# Patient Record
Sex: FEMALE | Race: WHITE | NOT HISPANIC OR LATINO | Employment: FULL TIME | URBAN - METROPOLITAN AREA
[De-identification: names, ages, dates, MRNs, and addresses within clinical notes are randomized per-mention and may not be internally consistent; named-entity substitution may affect disease eponyms.]

---

## 2020-09-17 ENCOUNTER — TELEPHONE (OUTPATIENT)
Dept: ENDOSCOPY | Facility: HOSPITAL | Age: 55
End: 2020-09-17

## 2020-09-17 DIAGNOSIS — K31.9 GASTRIC LESION: Primary | ICD-10-CM

## 2020-09-17 NOTE — TELEPHONE ENCOUNTER
----- Message from Ela Osman MD sent at 9/17/2020  1:12 PM CDT -----  Ok. Eus anywhere  ----- Message -----  From: Nela Polanco MA  Sent: 9/17/2020  11:29 AM CDT  To: Ela Osman MD      ----- Message -----  From: Lindsay Zavala  Sent: 9/17/2020  11:17 AM CDT  To: Richar Guerin    Good morning,    The pt listed above is being referred from Dr. Rafael Eduardo for EUS of submucosal lesion of the esophagus . I have scanned the referral and records into . Please advise or contact pt to schedule appt at your earliest convenience.    Thank You,  Lindsay Foley

## 2020-09-22 ENCOUNTER — TELEPHONE (OUTPATIENT)
Dept: ENDOSCOPY | Facility: HOSPITAL | Age: 55
End: 2020-09-22

## 2020-09-22 NOTE — TELEPHONE ENCOUNTER
Spoke with patient. EUS scheduled for 10/12 at 9a. Reviewed prep instructions. Ms Britt verbalized understanding.

## 2020-10-02 ENCOUNTER — TELEPHONE (OUTPATIENT)
Dept: ENDOSCOPY | Facility: HOSPITAL | Age: 55
End: 2020-10-02

## 2020-10-02 RX ORDER — LISINOPRIL 20 MG/1
20 TABLET ORAL DAILY
COMMUNITY

## 2020-10-02 RX ORDER — ONDANSETRON 4 MG/1
8 TABLET, FILM COATED ORAL EVERY 8 HOURS PRN
COMMUNITY

## 2020-10-02 RX ORDER — INSULIN LISPRO 100 [IU]/ML
10 INJECTION, SOLUTION INTRAVENOUS; SUBCUTANEOUS
COMMUNITY

## 2020-10-02 RX ORDER — PANTOPRAZOLE SODIUM 20 MG/1
40 TABLET, DELAYED RELEASE ORAL
COMMUNITY

## 2020-10-02 RX ORDER — FENOFIBRATE 40 MG/1
TABLET ORAL
COMMUNITY

## 2020-10-02 RX ORDER — TRAMADOL HYDROCHLORIDE 50 MG/1
50 TABLET ORAL EVERY 12 HOURS PRN
COMMUNITY

## 2020-10-02 RX ORDER — PROMETHAZINE HYDROCHLORIDE 12.5 MG/1
12.5 TABLET ORAL EVERY 8 HOURS PRN
COMMUNITY

## 2020-10-02 RX ORDER — FUROSEMIDE 20 MG/1
20 TABLET ORAL DAILY PRN
COMMUNITY

## 2020-10-02 RX ORDER — SUCRALFATE 1 G/1
1 TABLET ORAL 4 TIMES DAILY
COMMUNITY

## 2020-10-02 RX ORDER — METFORMIN HYDROCHLORIDE EXTENDED-RELEASE TABLETS 500 MG/1
500 TABLET, FILM COATED, EXTENDED RELEASE ORAL
COMMUNITY
End: 2021-09-28

## 2020-10-02 NOTE — TELEPHONE ENCOUNTER
Received request to schedule patient for EUS on 10/12/20 at 9:00am. Spoke with Patient. Reviewed medical history and medications. Instructed on procedure and prep. Patient verbalized understanding of instructions. E-mailed copy of instructions to address in chart.

## 2020-10-12 ENCOUNTER — ANESTHESIA EVENT (OUTPATIENT)
Dept: ENDOSCOPY | Facility: HOSPITAL | Age: 55
End: 2020-10-12
Payer: COMMERCIAL

## 2020-10-12 ENCOUNTER — ANESTHESIA (OUTPATIENT)
Dept: ENDOSCOPY | Facility: HOSPITAL | Age: 55
End: 2020-10-12
Payer: COMMERCIAL

## 2020-10-12 ENCOUNTER — HOSPITAL ENCOUNTER (OUTPATIENT)
Facility: HOSPITAL | Age: 55
Discharge: HOME | End: 2020-10-12
Attending: INTERNAL MEDICINE | Admitting: INTERNAL MEDICINE
Payer: COMMERCIAL

## 2020-10-12 VITALS
TEMPERATURE: 98 F | HEART RATE: 73 BPM | RESPIRATION RATE: 20 BRPM | WEIGHT: 206 LBS | SYSTOLIC BLOOD PRESSURE: 169 MMHG | DIASTOLIC BLOOD PRESSURE: 85 MMHG | OXYGEN SATURATION: 98 % | HEIGHT: 64 IN | BODY MASS INDEX: 35.17 KG/M2

## 2020-10-12 DIAGNOSIS — K22.9 SUBEPITHELIAL ESOPHAGEAL LESION: ICD-10-CM

## 2020-10-12 LAB
POCT GLUCOSE: 230 MG/DL (ref 70–110)
POCT GLUCOSE: 239 MG/DL (ref 70–110)
SARS-COV-2 RDRP RESP QL NAA+PROBE: NEGATIVE

## 2020-10-12 PROCEDURE — D9220A PRA ANESTHESIA: ICD-10-PCS | Mod: ANES,,, | Performed by: ANESTHESIOLOGY

## 2020-10-12 PROCEDURE — U0002 COVID-19 LAB TEST NON-CDC: HCPCS

## 2020-10-12 PROCEDURE — D9220A PRA ANESTHESIA: Mod: CRNA,,, | Performed by: NURSE ANESTHETIST, CERTIFIED REGISTERED

## 2020-10-12 PROCEDURE — 43259 EGD US EXAM DUODENUM/JEJUNUM: CPT | Performed by: INTERNAL MEDICINE

## 2020-10-12 PROCEDURE — 43259 EGD US EXAM DUODENUM/JEJUNUM: CPT | Mod: ,,, | Performed by: INTERNAL MEDICINE

## 2020-10-12 PROCEDURE — 43259 PR ENDOSCOPIC ULTRASOUND EXAM: ICD-10-PCS | Mod: ,,, | Performed by: INTERNAL MEDICINE

## 2020-10-12 PROCEDURE — 63600175 PHARM REV CODE 636 W HCPCS: Performed by: NURSE ANESTHETIST, CERTIFIED REGISTERED

## 2020-10-12 PROCEDURE — 25000003 PHARM REV CODE 250: Performed by: NURSE ANESTHETIST, CERTIFIED REGISTERED

## 2020-10-12 PROCEDURE — 82962 GLUCOSE BLOOD TEST: CPT | Performed by: INTERNAL MEDICINE

## 2020-10-12 PROCEDURE — 37000009 HC ANESTHESIA EA ADD 15 MINS: Performed by: INTERNAL MEDICINE

## 2020-10-12 PROCEDURE — 37000008 HC ANESTHESIA 1ST 15 MINUTES: Performed by: INTERNAL MEDICINE

## 2020-10-12 PROCEDURE — D9220A PRA ANESTHESIA: Mod: ANES,,, | Performed by: ANESTHESIOLOGY

## 2020-10-12 PROCEDURE — D9220A PRA ANESTHESIA: ICD-10-PCS | Mod: CRNA,,, | Performed by: NURSE ANESTHETIST, CERTIFIED REGISTERED

## 2020-10-12 RX ORDER — LIDOCAINE HCL/PF 100 MG/5ML
SYRINGE (ML) INTRAVENOUS
Status: DISCONTINUED | OUTPATIENT
Start: 2020-10-12 | End: 2020-10-12

## 2020-10-12 RX ORDER — PROPOFOL 10 MG/ML
VIAL (ML) INTRAVENOUS CONTINUOUS PRN
Status: DISCONTINUED | OUTPATIENT
Start: 2020-10-12 | End: 2020-10-12

## 2020-10-12 RX ORDER — SODIUM CHLORIDE 0.9 % (FLUSH) 0.9 %
10 SYRINGE (ML) INJECTION
Status: DISCONTINUED | OUTPATIENT
Start: 2020-10-12 | End: 2020-10-12 | Stop reason: HOSPADM

## 2020-10-12 RX ORDER — SODIUM CHLORIDE 9 MG/ML
INJECTION, SOLUTION INTRAVENOUS CONTINUOUS PRN
Status: DISCONTINUED | OUTPATIENT
Start: 2020-10-12 | End: 2020-10-12

## 2020-10-12 RX ORDER — ONDANSETRON 2 MG/ML
4 INJECTION INTRAMUSCULAR; INTRAVENOUS ONCE AS NEEDED
Status: DISCONTINUED | OUTPATIENT
Start: 2020-10-12 | End: 2020-10-12 | Stop reason: HOSPADM

## 2020-10-12 RX ORDER — PROPOFOL 10 MG/ML
VIAL (ML) INTRAVENOUS
Status: DISCONTINUED | OUTPATIENT
Start: 2020-10-12 | End: 2020-10-12

## 2020-10-12 RX ADMIN — Medication 100 MG: at 09:10

## 2020-10-12 RX ADMIN — PROPOFOL 80 MG: 10 INJECTION, EMULSION INTRAVENOUS at 09:10

## 2020-10-12 RX ADMIN — SODIUM CHLORIDE: 0.9 INJECTION, SOLUTION INTRAVENOUS at 08:10

## 2020-10-12 RX ADMIN — PROPOFOL 200 MCG/KG/MIN: 10 INJECTION, EMULSION INTRAVENOUS at 09:10

## 2020-10-12 NOTE — ANESTHESIA PREPROCEDURE EVALUATION
10/12/2020  Ritika Britt is a 55 y.o., female   Pre-operative evaluation for Procedure(s) (LRB):  ULTRASOUND, UPPER GI TRACT, ENDOSCOPIC (N/A)    Ritika Britt is a 55 y.o. female   Procedure was attempted at another facility but they were unable to get the biopsy, hx of PONV, but not with treatment. Hx of difficult intubation due to short neck and increased girth, but overcome with video laryngoscopy.     Patient Active Problem List   Diagnosis    Subepithelial esophageal lesion       Review of patient's allergies indicates:  No Known Allergies    No current facility-administered medications on file prior to encounter.      No current outpatient medications on file prior to encounter.       Past Surgical History:   Procedure Laterality Date    COLONOSCOPY      ESOPHAGOGASTRODUODENOSCOPY      HYSTERECTOMY         Anesthesia Evaluation    I have reviewed the Patient Summary Reports.   I have reviewed the NPO Status.   I have reviewed the Medications.     Review of Systems  Anesthesia Hx:  No problems with previous Anesthesia Denies Hx of Anesthetic complications  History of prior surgery of interest to airway management or planning: Denies Family Hx of Anesthesia complications.   Denies Personal Hx of Anesthesia complications.   Social:  No Alcohol Use, Non-Smoker    Hematology/Oncology:  Hematology Normal   Oncology Normal     EENT/Dental:EENT/Dental Normal   Cardiovascular:   Exercise tolerance: good Hypertension    Pulmonary:  Pulmonary Normal    Renal/:  Renal/ Normal     Hepatic/GI:  Hepatic/GI Normal Subepithelial esophageal lesion in need of biopsy   Neurological:  Neurology Normal    Endocrine:  Endocrine Normal    Psych:  Psychiatric Normal           Physical Exam  General:  Obesity, Well nourished    Airway/Jaw/Neck:  Airway Findings: Mouth Opening: Normal Tongue: Normal   General Airway Assessment: Adult, Average, Possible difficult intubation  Mallampati: III  TM Distance: 4 - 6 cm  Jaw/Neck Findings:  Neck ROM: Normal ROM  Neck Findings:  Girth Increased, Short Neck      Dental:  Dental Findings: In tact   Chest/Lungs:  Chest/Lungs Findings: Normal Respiratory Rate, Clear to auscultation     Heart/Vascular:  Heart Findings: Rate: Normal  Rhythm: Regular Rhythm        Mental Status:  Mental Status Findings:  Alert and Oriented, Cooperative         Anesthesia Plan  Type of Anesthesia, risks & benefits discussed:  Anesthesia Type:  general  Patient's Preference:   Intra-op Monitoring Plan: standard ASA monitors  Intra-op Monitoring Plan Comments:   Post Op Pain Control Plan: multimodal analgesia, IV/PO Opioids PRN and per primary service following discharge from PACU  Post Op Pain Control Plan Comments:   Induction:   IV  Beta Blocker:  Patient is not currently on a Beta-Blocker (No further documentation required).       Informed Consent: Patient understands risks and agrees with Anesthesia plan.  Questions answered. Anesthesia consent signed with patient.  ASA Score: 2     Day of Surgery Review of History & Physical:    H&P update referred to the provider.         Ready For Surgery From Anesthesia Perspective.

## 2020-10-12 NOTE — TRANSFER OF CARE
"Anesthesia Transfer of Care Note    Patient: Ritika Britt    Procedure(s) Performed: Procedure(s) (LRB):  ULTRASOUND, UPPER GI TRACT, ENDOSCOPIC (N/A)    Patient location: [facility]    Anesthesia Type: general    Transport from OR: Transported from OR on room air with adequate spontaneous ventilation    Post pain: adequate analgesia    Post assessment: no apparent anesthetic complications and tolerated procedure well    Post vital signs: stable    Level of consciousness: awake, alert and oriented    Nausea/Vomiting: no nausea/vomiting    Complications: none    Transfer of care protocol was followed      Last vitals:   Visit Vitals  BP (!) 151/75 (BP Location: Left arm, Patient Position: Lying)   Pulse 85   Temp 36.5 °C (97.7 °F) (Temporal)   Resp 18   Ht 5' 4" (1.626 m)   Wt 93.4 kg (206 lb)   SpO2 (!) 94%   Breastfeeding No   BMI 35.36 kg/m²     "

## 2020-10-12 NOTE — H&P
Short Stay Endoscopy History and Physical    PCP - Primary Doctor No  Referring Physician - Ela Osman MD  [address] [place] [address]    Procedure - eus  ASA - per anesthesia  Mallampati - per anesthesia  History of Anesthesia problems - no  Family history Anesthesia problems -  no   Plan of anesthesia - General    HPI:  This is a 55 y.o. female here for evaluation of: esophageal lesion    Reflux - no  Dysphagia - no  Abdominal pain - no  Diarrhea - no    ROS:  Constitutional: No fevers, chills, No weight loss  CV: No chest pain  Pulm: No cough, No shortness of breath  Ophtho: No vision changes  GI: see HPI  Derm: No rash    Medical History:  has a past medical history of Dysphagia, Epigastric pain, Esophagitis, Fatty liver, Heartburn, Nausea, and Submucosal lesion of esophagus.    Surgical History:  has a past surgical history that includes Hysterectomy; Esophagogastroduodenoscopy; and Colonoscopy.    Family History: family history includes Cancer in her father and mother; Diabetes in her father and mother; Victoria-Danlos syndrome in her sister; Hypertension in her father and mother..    Social History:  reports that she has never smoked. She has never used smokeless tobacco. She reports that she does not drink alcohol or use drugs.    Review of patient's allergies indicates:  No Known Allergies    Medications:   Medications Prior to Admission   Medication Sig Dispense Refill Last Dose    fenofibrate 40 mg Tab Take by mouth.   10/11/2020 at Unknown time    furosemide (LASIX) 20 MG tablet Take 20 mg by mouth daily as needed.   Past Week at Unknown time    insulin glargine,hum.rec.anlog (LANTUS SUBQ) Inject 70 Units into the skin once daily.   10/11/2020 at Unknown time    insulin lispro 100 unit/mL injection Inject 10 Units into the skin 3 (three) times daily before meals.   10/11/2020 at Unknown time    lisinopriL (PRINIVIL,ZESTRIL) 20 MG tablet Take 20 mg by mouth once daily.    10/12/2020 at Unknown time    metFORMIN (FORTAMET) 500 mg 24hr tablet Take 500 mg by mouth daily with breakfast.   10/11/2020 at Unknown time    ondansetron (ZOFRAN) 4 MG tablet Take 8 mg by mouth every 8 (eight) hours as needed for Nausea.   Past Month at Unknown time    pantoprazole (PROTONIX) 20 MG tablet Take 40 mg by mouth 2 (two) times daily before meals.   10/11/2020 at Unknown time    promethazine (PHENERGAN) 12.5 MG Tab Take 12.5 mg by mouth every 8 (eight) hours as needed.   Past Month at Unknown time    sucralfate (CARAFATE) 1 gram tablet Take 1 g by mouth 4 (four) times daily.   10/11/2020 at Unknown time    traMADoL (ULTRAM) 50 mg tablet Take 50 mg by mouth every 12 (twelve) hours as needed for Pain.   Past Month at Unknown time       Physical Exam:    Vital Signs:   Vitals:    10/12/20 0821   BP: (!) 173/96   Pulse: 87   Resp: 17   Temp: 98.1 °F (36.7 °C)       General Appearance: Well appearing in no acute distress    Labs:  No results found for: WBC, HGB, HCT, PLT, CHOL, TRIG, HDL, LDLDIRECT, ALT, AST, NA, K, CL, CREATININE, BUN, CO2, TSH, PSA, INR, GLUF, HGBA1C, MICROALBUR    I have explained the risks and benefits of this endoscopic procedure to the patient including but not limited to bleeding, inflammation, infection, perforation, and death.      Tree Garsia MD

## 2020-10-12 NOTE — DISCHARGE INSTRUCTIONS
Endoscopic Ultrasound (EUS)    An endoscopic ultrasound (EUS) is a test to look at the inside of your gastrointestinal (GI) tract. It's commonly used to look for cancers or growths in the esophagus, stomach, pancreas, liver, and rectum. It can help to stage cancer (see how advanced a cancer is). EUS may also be used to help diagnose certain diseases or to drain cysts or abscesses.  What is EUS?  EUS shows both ultrasound images and live video of the GI tract. During the test, a flexible tube called an endoscope (scope) is used. At the end of the scope is a tiny video camera and light. The video camera sends live images to a monitor. The scope also contains a very small ultrasound device. This uses sound waves to create images and send them to a monitor.  A needle is passed through the scope. The needle can be used take a small sample of tissue for testing. This is called a biopsy. The needle can be used to take a sample of fluid. This is called fine-needle aspiration (FNA).  Risks and possible complications of EUS  Risks and possible complications include the following:  · Bleeding  · Infection  · A perforation (hole) in the digestive tract   · Risks of sedation or anesthesia   Before the test  Be prepared prior to the test:  · Tell your healthcare provider what medicine you take. This includes vitamins, herbs, and over-the-counter medicine. It also includes any blood thinners, such as warfarin, clopidogrel, ibuprofen, or daily aspirin. Ask your healthcare provider if you need to stop taking some or all of them before the test.  · You may be prescribed antibiotics to take before or after the test. This depends on the area being studied and what is done during the test. These medicines help prevent infection.  · Carefully follow the instructions for preparing for the test to make sure results are accurate. Instructions may include:  ¨ If youre having an EUS of the upper GI tract (esophagus, stomach, duodenum,  pancreas, liver):  § Do not eat or drink for 6 hours before the test.  ¨ If youre having an EUS of the lower GI tract (rectum):  § Before the test, do bowel prep as instructed to clean your rectum of stool. This may involve a clear liquid diet and using a laxative (liquid or pills) the night before the test. Or it may mean doing one or more enemas the morning of the test.  § Do not eat or drink for 6 hours before the test.  · Be sure to arrive on time at the facility. Bring your identification and health insurance card. Leave valuables at home. If you have them, bring X-rays or other test results with you.  Let the healthcare provider know  For your safety, tell the healthcare provider if you:  · Take insulin. Your dose may need to be changed on the day of your test.  · Are allergic to latex.  · Have any other allergies.  · Are taking blood thinners.   During the test  An endoscopic ultrasound usually takes place in a hospital. The procedure itself may take 1 to 2 hours. You will likely go home soon afterward. During the test:  · You lie on your left side on an exam table.  · An intravenous (IV) line will be put into a vein in your arm or hand. This line supplies fluids and medicines. To keep you comfortable during the test, you will be given a sedative medicine. This medicine prevents discomfort and will make you sleepy.  · If you are having an EUS of the upper GI tract, local anesthetic may be sprayed in your throat. This will help you be more comfortable as the healthcare provider inserts the scope. The healthcare provider then gently puts the flexible scope into your mouth or nose and down your throat.  · If youre having an EUS of the lower GI tract, the healthcare provider gently puts the flexible scope into your anus.  · During the test, the scope sends live video and ultrasound images from inside your body to nearby monitors. These are used to examine your GI tract. Specialized procedures, such as drainage,  are done as needed.  · The healthcare provider may discuss the results with you soon after the test. Biopsy results take several  days.  · In most cases, you can go home within a few hours of the test. When you leave the facility, have an adult family member or friend drive you, even if you don't feel that sleepy.  After the test  Here is what to expect after the test:  · You may feel tired from the sedative. This should wear off by the end of the day.  · If you had an upper digestive endoscopy, your throat may feel sore for a day or two. Over-the-counter sore throat lozenges and spray should help.  · You can eat and drink normally as soon as the test is done.  When to call the healthcare provider  Call your healthcare provider if you notice any of the following:  · Fever of 100.4°F (38.0°C) or higher, or as advised by your healthcare provider  · Shortness of breath  · Vomiting blood, blood in stool, or black stools  · Coughing or hoarse voice that wont go away   Date Last Reviewed: 7/1/2016  © 9075-5947 [employer]. [address] [place] [id number]. All rights reserved. This information is not intended as a substitute for professional medical care. Always follow your healthcare professional's instructions.

## 2020-10-12 NOTE — [employer] PATIENT INSTRUCTIONS
Discharge Summary/Instructions after an Endoscopic Procedure  Patient Name: Ritika Roldan  Patient MRN: [id number]  Patient YOB: 1965 Monday, October 12, 2020  Tree Garsia MD  RESTRICTIONS:  During your procedure today, you received medications for sedation.  These   medications may affect your judgment, balance and coordination.  Therefore,   for 24 hours, you have the following restrictions:   - DO NOT drive a car, operate machinery, make legal/financial decisions,   sign important papers or drink alcohol.    ACTIVITY:  Today: no heavy lifting, straining or running due to procedural   sedation/anesthesia.  The following day: return to full activity including work.  DIET:  Eat and drink normally unless instructed otherwise.     TREATMENT FOR COMMON SIDE EFFECTS:  - Mild abdominal pain, nausea, belching, bloating or excessive gas:  rest,   eat lightly and use a heating pad.  - Sore Throat: treat with throat lozenges and/or gargle with warm salt   water.  - Because air was used during the procedure, expelling large amounts of air   from your rectum or belching is normal.  - If a bowel prep was taken, you may not have a bowel movement for 1-3 days.    This is normal.  SYMPTOMS TO WATCH FOR AND REPORT TO YOUR PHYSICIAN:  1. Abdominal pain or bloating, other than gas cramps.  2. Chest pain.  3. Back pain.  4. Signs of infection such as: chills or fever occurring within 24 hours   after the procedure.  5. Rectal bleeding, which would show as bright red, maroon, or black stools.   (A tablespoon of blood from the rectum is not serious, especially if   hemorrhoids are present.)  6. Vomiting.  7. Weakness or dizziness.  GO DIRECTLY TO THE NEAREST EMERGENCY ROOM IF YOU HAVE ANY OF THE FOLLOWING:      Difficulty breathing              Chills and/or fever over 101 F   Persistent vomiting and/or vomiting blood   Severe abdominal pain   Severe chest pain   Black, tarry stools   Bleeding- more than one  tablespoon   Any other symptom or condition that you feel may need urgent attention  Your doctor recommends these additional instructions:  If any biopsies were taken, your doctors clinic will contact you in 1 to 2   weeks with any results.  - Discharge patient to home.   - Resume previous diet.   - Continue present medications.   - Repeat the upper endoscopic ultrasound in 6 months for surveillance. If   it's larger, plan for FNA and surgical referral.  For questions, problems or results please call your physician - Tree Garsia MD at Work:  [phone].  [facility], EMERGENCY ROOM PHONE NUMBER: [phone]  IF A COMPLICATION OR EMERGENCY SITUATION ARISES AND YOU ARE UNABLE TO REACH   YOUR PHYSICIAN - GO DIRECTLY TO THE EMERGENCY ROOM.  Tree Garsia MD  10/12/2020 9:24:47 AM  This report has been verified and signed electronically.  [employer]

## 2020-10-12 NOTE — PLAN OF CARE
Plan of care reviewed with patient/family. Discharge instruction given; verbalizes understanding. Handouts given. Vital signs stable. Denies any pain/SOB/CP. Tolerated procedure/ anesthesia well. Consents in chart.

## 2020-10-12 NOTE — ANESTHESIA POSTPROCEDURE EVALUATION
Anesthesia Post Evaluation    Patient: Ritika Britt    Procedure(s) Performed: Procedure(s) (LRB):  ULTRASOUND, UPPER GI TRACT, ENDOSCOPIC (N/A)    Final Anesthesia Type: general    Patient location during evaluation: [facility]  Patient participation: Yes- Able to Participate  Level of consciousness: awake and alert and oriented  Post-procedure vital signs: reviewed and stable  Pain management: adequate  Airway patency: patent    PONV status at discharge: No PONV  Anesthetic complications: no      Cardiovascular status: blood pressure returned to baseline and hemodynamically stable  Respiratory status: unassisted, spontaneous ventilation and room air  Hydration status: euvolemic  Follow-up not needed.          Vitals Value Taken Time   /85 10/12/20 1002   Temp 36.5 °C (97.7 °F) 10/12/20 0915   Pulse 75 10/12/20 1003   Resp 20 10/12/20 1000   SpO2 97 % 10/12/20 1003   Vitals shown include unvalidated device data.      No case tracking events are documented in the log.      Pain/Yi Score: Yi Score: 10 (10/12/2020 10:06 AM)

## 2021-02-04 ENCOUNTER — TELEPHONE (OUTPATIENT)
Dept: ENDOSCOPY | Facility: HOSPITAL | Age: 56
End: 2021-02-04

## 2021-02-04 DIAGNOSIS — K86.2 PANCREATIC CYST: Primary | ICD-10-CM

## 2021-02-05 ENCOUNTER — TELEPHONE (OUTPATIENT)
Dept: ENDOSCOPY | Facility: HOSPITAL | Age: 56
End: 2021-02-05

## 2021-02-08 ENCOUNTER — TELEPHONE (OUTPATIENT)
Dept: ENDOSCOPY | Facility: HOSPITAL | Age: 56
End: 2021-02-08

## 2021-04-08 ENCOUNTER — TELEPHONE (OUTPATIENT)
Dept: ENDOSCOPY | Facility: HOSPITAL | Age: 56
End: 2021-04-08

## 2021-04-09 ENCOUNTER — TELEPHONE (OUTPATIENT)
Dept: ENDOSCOPY | Facility: HOSPITAL | Age: 56
End: 2021-04-09

## 2021-04-12 ENCOUNTER — TELEPHONE (OUTPATIENT)
Dept: ENDOSCOPY | Facility: HOSPITAL | Age: 56
End: 2021-04-12

## 2021-04-20 ENCOUNTER — TELEPHONE (OUTPATIENT)
Dept: ENDOSCOPY | Facility: HOSPITAL | Age: 56
End: 2021-04-20

## 2021-04-21 ENCOUNTER — TELEPHONE (OUTPATIENT)
Dept: ENDOSCOPY | Facility: HOSPITAL | Age: 56
End: 2021-04-21

## 2021-04-23 ENCOUNTER — HOSPITAL ENCOUNTER (OUTPATIENT)
Facility: HOSPITAL | Age: 56
Discharge: HOME | End: 2021-04-23
Attending: INTERNAL MEDICINE | Admitting: INTERNAL MEDICINE
Payer: COMMERCIAL

## 2021-04-23 ENCOUNTER — ANESTHESIA (OUTPATIENT)
Dept: ENDOSCOPY | Facility: HOSPITAL | Age: 56
End: 2021-04-23
Payer: COMMERCIAL

## 2021-04-23 ENCOUNTER — ANESTHESIA EVENT (OUTPATIENT)
Dept: ENDOSCOPY | Facility: HOSPITAL | Age: 56
End: 2021-04-23
Payer: COMMERCIAL

## 2021-04-23 VITALS
WEIGHT: 206 LBS | SYSTOLIC BLOOD PRESSURE: 126 MMHG | BODY MASS INDEX: 35.17 KG/M2 | HEIGHT: 64 IN | TEMPERATURE: 98 F | OXYGEN SATURATION: 96 % | RESPIRATION RATE: 18 BRPM | DIASTOLIC BLOOD PRESSURE: 74 MMHG | HEART RATE: 83 BPM

## 2021-04-23 DIAGNOSIS — K22.9 SUBEPITHELIAL ESOPHAGEAL LESION: Primary | ICD-10-CM

## 2021-04-23 DIAGNOSIS — R19.8 ABNORMAL FINDINGS ON ESOPHAGOGASTRODUODENOSCOPY (EGD): ICD-10-CM

## 2021-04-23 LAB
GLUCOSE SERPL-MCNC: 237 MG/DL (ref 70–110)
POCT GLUCOSE: 237 MG/DL (ref 70–110)
SARS-COV-2 RDRP RESP QL NAA+PROBE: NEGATIVE

## 2021-04-23 PROCEDURE — 88177 CYTP FNA EVAL EA ADDL: CPT | Performed by: PATHOLOGY

## 2021-04-23 PROCEDURE — 88173 PR  INTERPRETATION OF FNA SMEAR: ICD-10-PCS | Mod: 26,,, | Performed by: PATHOLOGY

## 2021-04-23 PROCEDURE — 63600175 PHARM REV CODE 636 W HCPCS: Performed by: NURSE ANESTHETIST, CERTIFIED REGISTERED

## 2021-04-23 PROCEDURE — 43242 PR UPGI ENDOSCOPY,FN NEEDLE BX,GUIDED: ICD-10-PCS | Mod: ,,, | Performed by: INTERNAL MEDICINE

## 2021-04-23 PROCEDURE — 88173 CYTOPATH EVAL FNA REPORT: CPT | Performed by: PATHOLOGY

## 2021-04-23 PROCEDURE — 88172 CYTP DX EVAL FNA 1ST EA SITE: CPT | Performed by: PATHOLOGY

## 2021-04-23 PROCEDURE — 88177 CYTP FNA EVAL EA ADDL: CPT | Mod: 26,,, | Performed by: PATHOLOGY

## 2021-04-23 PROCEDURE — 88342 IMHCHEM/IMCYTCHM 1ST ANTB: CPT | Performed by: PATHOLOGY

## 2021-04-23 PROCEDURE — 37000009 HC ANESTHESIA EA ADD 15 MINS: Performed by: INTERNAL MEDICINE

## 2021-04-23 PROCEDURE — 88305 TISSUE EXAM BY PATHOLOGIST: CPT | Performed by: PATHOLOGY

## 2021-04-23 PROCEDURE — 88342 CHG IMMUNOCYTOCHEMISTRY: ICD-10-PCS | Mod: 26,,, | Performed by: PATHOLOGY

## 2021-04-23 PROCEDURE — 37000008 HC ANESTHESIA 1ST 15 MINUTES: Performed by: INTERNAL MEDICINE

## 2021-04-23 PROCEDURE — U0002 COVID-19 LAB TEST NON-CDC: HCPCS | Performed by: INTERNAL MEDICINE

## 2021-04-23 PROCEDURE — 25000003 PHARM REV CODE 250: Performed by: NURSE ANESTHETIST, CERTIFIED REGISTERED

## 2021-04-23 PROCEDURE — 43242 EGD US FINE NEEDLE BX/ASPIR: CPT | Mod: ,,, | Performed by: INTERNAL MEDICINE

## 2021-04-23 PROCEDURE — 88305 TISSUE EXAM BY PATHOLOGIST: ICD-10-PCS | Mod: 26,,, | Performed by: PATHOLOGY

## 2021-04-23 PROCEDURE — 88342 IMHCHEM/IMCYTCHM 1ST ANTB: CPT | Mod: 26,,, | Performed by: PATHOLOGY

## 2021-04-23 PROCEDURE — 27202131 HC NEEDLE, FNB SINGLE (ANY): Performed by: INTERNAL MEDICINE

## 2021-04-23 PROCEDURE — 88177 PR  EVALUATION OF FNA SMEAR TO DETERMINE ADEQUACY, EA ADD EVAL: ICD-10-PCS | Mod: 26,,, | Performed by: PATHOLOGY

## 2021-04-23 PROCEDURE — 88172 CYTP DX EVAL FNA 1ST EA SITE: CPT | Mod: 26,,, | Performed by: PATHOLOGY

## 2021-04-23 PROCEDURE — 88305 TISSUE EXAM BY PATHOLOGIST: CPT | Mod: 26,,, | Performed by: PATHOLOGY

## 2021-04-23 PROCEDURE — 88173 CYTOPATH EVAL FNA REPORT: CPT | Mod: 26,,, | Performed by: PATHOLOGY

## 2021-04-23 PROCEDURE — 43242 EGD US FINE NEEDLE BX/ASPIR: CPT | Performed by: INTERNAL MEDICINE

## 2021-04-23 PROCEDURE — 25000003 PHARM REV CODE 250: Performed by: INTERNAL MEDICINE

## 2021-04-23 PROCEDURE — 88172 PR  EVALUATION OF FNA SMEAR TO DETERMINE ADEQUACY, FIRST EVAL: ICD-10-PCS | Mod: 26,,, | Performed by: PATHOLOGY

## 2021-04-23 RX ORDER — PROPOFOL 10 MG/ML
VIAL (ML) INTRAVENOUS
Status: DISCONTINUED | OUTPATIENT
Start: 2021-04-23 | End: 2021-04-23

## 2021-04-23 RX ORDER — SODIUM CHLORIDE 0.9 % (FLUSH) 0.9 %
10 SYRINGE (ML) INJECTION
Status: DISCONTINUED | OUTPATIENT
Start: 2021-04-23 | End: 2021-04-23 | Stop reason: HOSPADM

## 2021-04-23 RX ORDER — PROPOFOL 10 MG/ML
VIAL (ML) INTRAVENOUS CONTINUOUS PRN
Status: DISCONTINUED | OUTPATIENT
Start: 2021-04-23 | End: 2021-04-23

## 2021-04-23 RX ORDER — SODIUM CHLORIDE 9 MG/ML
INJECTION, SOLUTION INTRAVENOUS CONTINUOUS
Status: DISCONTINUED | OUTPATIENT
Start: 2021-04-23 | End: 2021-04-23 | Stop reason: HOSPADM

## 2021-04-23 RX ORDER — LIDOCAINE HCL/PF 100 MG/5ML
SYRINGE (ML) INTRAVENOUS
Status: DISCONTINUED | OUTPATIENT
Start: 2021-04-23 | End: 2021-04-23

## 2021-04-23 RX ADMIN — PROPOFOL 150 MCG/KG/MIN: 10 INJECTION, EMULSION INTRAVENOUS at 11:04

## 2021-04-23 RX ADMIN — PROPOFOL 75 MG: 10 INJECTION, EMULSION INTRAVENOUS at 11:04

## 2021-04-23 RX ADMIN — LIDOCAINE HYDROCHLORIDE 70 MG: 20 INJECTION, SOLUTION INTRAVENOUS at 11:04

## 2021-04-23 RX ADMIN — PROPOFOL 50 MG: 10 INJECTION, EMULSION INTRAVENOUS at 11:04

## 2021-04-23 RX ADMIN — SODIUM CHLORIDE: 0.9 INJECTION, SOLUTION INTRAVENOUS at 11:04

## 2021-04-26 ENCOUNTER — TELEPHONE (OUTPATIENT)
Dept: ENDOSCOPY | Facility: HOSPITAL | Age: 56
End: 2021-04-26

## 2021-04-26 DIAGNOSIS — K22.9 NODULE OF ESOPHAGUS: Primary | ICD-10-CM

## 2021-04-28 ENCOUNTER — PATIENT MESSAGE (OUTPATIENT)
Dept: GASTROENTEROLOGY | Facility: CLINIC | Age: 56
End: 2021-04-28

## 2021-04-29 ENCOUNTER — TELEPHONE (OUTPATIENT)
Dept: SURGERY | Facility: CLINIC | Age: 56
End: 2021-04-29

## 2021-04-30 ENCOUNTER — TELEPHONE (OUTPATIENT)
Dept: SURGERY | Facility: CLINIC | Age: 56
End: 2021-04-30

## 2021-04-30 DIAGNOSIS — K22.9 SUBEPITHELIAL ESOPHAGEAL LESION: Primary | ICD-10-CM

## 2021-04-30 DIAGNOSIS — R19.8 ABNORMAL FINDINGS ON ESOPHAGOGASTRODUODENOSCOPY (EGD): ICD-10-CM

## 2021-05-11 ENCOUNTER — OFFICE VISIT (OUTPATIENT)
Dept: SURGERY | Facility: CLINIC | Age: 56
End: 2021-05-11
Payer: COMMERCIAL

## 2021-05-11 ENCOUNTER — HOSPITAL ENCOUNTER (OUTPATIENT)
Dept: RADIOLOGY | Facility: HOSPITAL | Age: 56
Discharge: HOME | End: 2021-05-11
Attending: SURGERY
Payer: COMMERCIAL

## 2021-05-11 VITALS
DIASTOLIC BLOOD PRESSURE: 93 MMHG | HEART RATE: 82 BPM | SYSTOLIC BLOOD PRESSURE: 197 MMHG | WEIGHT: 214.19 LBS | HEIGHT: 64 IN | BODY MASS INDEX: 36.57 KG/M2 | RESPIRATION RATE: 18 BRPM | OXYGEN SATURATION: 96 %

## 2021-05-11 DIAGNOSIS — K22.9 NODULE OF ESOPHAGUS: ICD-10-CM

## 2021-05-11 DIAGNOSIS — K22.9 SUBEPITHELIAL ESOPHAGEAL LESION: ICD-10-CM

## 2021-05-11 DIAGNOSIS — R19.8 ABNORMAL FINDINGS ON ESOPHAGOGASTRODUODENOSCOPY (EGD): ICD-10-CM

## 2021-05-11 LAB
CREAT SERPL-MCNC: 0.6 MG/DL (ref 0.5–1.4)
SAMPLE: NORMAL

## 2021-05-11 PROCEDURE — 25500020 PHARM REV CODE 255: Performed by: SURGERY

## 2021-05-11 PROCEDURE — 74177 CT CHEST ABDOMEN PELVIS WITH CONTRAST (XPD): ICD-10-PCS | Mod: 26,,, | Performed by: RADIOLOGY

## 2021-05-11 PROCEDURE — 4010F ACE/ARB THERAPY RXD/TAKEN: CPT | Mod: CPTII,S$GLB,, | Performed by: SURGERY

## 2021-05-11 PROCEDURE — 4010F PR ACE/ARB THEARPY RXD/TAKEN: ICD-10-PCS | Mod: CPTII,S$GLB,, | Performed by: SURGERY

## 2021-05-11 PROCEDURE — 3008F PR BODY MASS INDEX (BMI) DOCUMENTED: ICD-10-PCS | Mod: CPTII,S$GLB,, | Performed by: SURGERY

## 2021-05-11 PROCEDURE — 3077F PR MOST RECENT SYSTOLIC BLOOD PRESSURE >= 140 MM HG: ICD-10-PCS | Mod: CPTII,S$GLB,, | Performed by: SURGERY

## 2021-05-11 PROCEDURE — 74177 CT ABD & PELVIS W/CONTRAST: CPT | Mod: 26,,, | Performed by: RADIOLOGY

## 2021-05-11 PROCEDURE — 99204 OFFICE O/P NEW MOD 45 MIN: CPT | Mod: S$GLB,,, | Performed by: SURGERY

## 2021-05-11 PROCEDURE — 71260 CT CHEST ABDOMEN PELVIS WITH CONTRAST (XPD): ICD-10-PCS | Mod: 26,,, | Performed by: RADIOLOGY

## 2021-05-11 PROCEDURE — 3080F DIAST BP >= 90 MM HG: CPT | Mod: CPTII,S$GLB,, | Performed by: SURGERY

## 2021-05-11 PROCEDURE — 99999 PR PBB SHADOW E&M-EST. PATIENT-LVL IV: CPT | Mod: PBBFAC,,, | Performed by: SURGERY

## 2021-05-11 PROCEDURE — 99999 PR PBB SHADOW E&M-EST. PATIENT-LVL IV: ICD-10-PCS | Mod: PBBFAC,,, | Performed by: SURGERY

## 2021-05-11 PROCEDURE — 3080F PR MOST RECENT DIASTOLIC BLOOD PRESSURE >= 90 MM HG: ICD-10-PCS | Mod: CPTII,S$GLB,, | Performed by: SURGERY

## 2021-05-11 PROCEDURE — 74177 CT ABD & PELVIS W/CONTRAST: CPT | Mod: TC

## 2021-05-11 PROCEDURE — 71260 CT THORAX DX C+: CPT | Mod: 26,,, | Performed by: RADIOLOGY

## 2021-05-11 PROCEDURE — 3008F BODY MASS INDEX DOCD: CPT | Mod: CPTII,S$GLB,, | Performed by: SURGERY

## 2021-05-11 PROCEDURE — 99204 PR OFFICE/OUTPT VISIT, NEW, LEVL IV, 45-59 MIN: ICD-10-PCS | Mod: S$GLB,,, | Performed by: SURGERY

## 2021-05-11 PROCEDURE — 3077F SYST BP >= 140 MM HG: CPT | Mod: CPTII,S$GLB,, | Performed by: SURGERY

## 2021-05-11 RX ORDER — LISINOPRIL AND HYDROCHLOROTHIAZIDE 20; 25 MG/1; MG/1
1 TABLET ORAL
COMMUNITY

## 2021-05-11 RX ORDER — AMOXICILLIN AND CLAVULANATE POTASSIUM 875; 125 MG/1; MG/1
1 TABLET, FILM COATED ORAL 2 TIMES DAILY
COMMUNITY
Start: 2021-03-31

## 2021-05-11 RX ORDER — FENOFIBRATE 48 MG/1
TABLET, FILM COATED ORAL
COMMUNITY
Start: 2020-08-17

## 2021-05-11 RX ORDER — SEMAGLUTIDE 1.34 MG/ML
1 INJECTION, SOLUTION SUBCUTANEOUS
COMMUNITY
Start: 2021-03-31 | End: 2021-09-28

## 2021-05-11 RX ORDER — METFORMIN HYDROCHLORIDE 500 MG/5ML
500 SOLUTION ORAL
COMMUNITY
Start: 2020-08-17 | End: 2021-09-28

## 2021-05-11 RX ADMIN — IOHEXOL 15 ML: 350 INJECTION, SOLUTION INTRAVENOUS at 11:05

## 2021-05-11 RX ADMIN — IOHEXOL 100 ML: 350 INJECTION, SOLUTION INTRAVENOUS at 11:05

## 2021-05-12 ENCOUNTER — TELEPHONE (OUTPATIENT)
Dept: SURGERY | Facility: CLINIC | Age: 56
End: 2021-05-12

## 2021-05-17 ENCOUNTER — TUMOR BOARD CONFERENCE (OUTPATIENT)
Dept: SURGERY | Facility: CLINIC | Age: 56
End: 2021-05-17

## 2021-05-17 ENCOUNTER — TELEPHONE (OUTPATIENT)
Dept: SURGERY | Facility: CLINIC | Age: 56
End: 2021-05-17

## 2021-05-17 LAB
ADEQUACY: NORMAL
FINAL PATHOLOGIC DIAGNOSIS: NORMAL
Lab: NORMAL
SUPPLEMENTAL DIAGNOSIS: NORMAL

## 2021-05-20 ENCOUNTER — HOSPITAL ENCOUNTER (OUTPATIENT)
Dept: RADIOLOGY | Facility: HOSPITAL | Age: 56
Discharge: HOME | End: 2021-05-20
Attending: SURGERY
Payer: COMMERCIAL

## 2021-05-20 ENCOUNTER — OFFICE VISIT (OUTPATIENT)
Dept: SURGERY | Facility: CLINIC | Age: 56
End: 2021-05-20
Payer: COMMERCIAL

## 2021-05-20 VITALS
WEIGHT: 212.31 LBS | SYSTOLIC BLOOD PRESSURE: 152 MMHG | OXYGEN SATURATION: 97 % | BODY MASS INDEX: 36.25 KG/M2 | HEIGHT: 64 IN | HEART RATE: 88 BPM | DIASTOLIC BLOOD PRESSURE: 92 MMHG

## 2021-05-20 DIAGNOSIS — Q89.09 SPLEEN ANOMALY: Primary | ICD-10-CM

## 2021-05-20 DIAGNOSIS — K22.9 NODULE OF ESOPHAGUS: ICD-10-CM

## 2021-05-20 PROCEDURE — 3008F BODY MASS INDEX DOCD: CPT | Mod: CPTII,S$GLB,, | Performed by: SURGERY

## 2021-05-20 PROCEDURE — 3008F PR BODY MASS INDEX (BMI) DOCUMENTED: ICD-10-PCS | Mod: CPTII,S$GLB,, | Performed by: SURGERY

## 2021-05-20 PROCEDURE — 99999 PR PBB SHADOW E&M-EST. PATIENT-LVL IV: ICD-10-PCS | Mod: PBBFAC,,, | Performed by: SURGERY

## 2021-05-20 PROCEDURE — 25500020 PHARM REV CODE 255: Performed by: SURGERY

## 2021-05-20 PROCEDURE — 3077F PR MOST RECENT SYSTOLIC BLOOD PRESSURE >= 140 MM HG: ICD-10-PCS | Mod: CPTII,S$GLB,, | Performed by: SURGERY

## 2021-05-20 PROCEDURE — 74220 FL ESOPHAGRAM COMPLETE: ICD-10-PCS | Mod: 26,,, | Performed by: RADIOLOGY

## 2021-05-20 PROCEDURE — 74220 X-RAY XM ESOPHAGUS 1CNTRST: CPT | Mod: 26,,, | Performed by: RADIOLOGY

## 2021-05-20 PROCEDURE — A9698 NON-RAD CONTRAST MATERIALNOC: HCPCS | Performed by: SURGERY

## 2021-05-20 PROCEDURE — 99999 PR PBB SHADOW E&M-EST. PATIENT-LVL IV: CPT | Mod: PBBFAC,,, | Performed by: SURGERY

## 2021-05-20 PROCEDURE — 99212 OFFICE O/P EST SF 10 MIN: CPT | Mod: S$GLB,,, | Performed by: SURGERY

## 2021-05-20 PROCEDURE — 3080F PR MOST RECENT DIASTOLIC BLOOD PRESSURE >= 90 MM HG: ICD-10-PCS | Mod: CPTII,S$GLB,, | Performed by: SURGERY

## 2021-05-20 PROCEDURE — 3077F SYST BP >= 140 MM HG: CPT | Mod: CPTII,S$GLB,, | Performed by: SURGERY

## 2021-05-20 PROCEDURE — 4010F ACE/ARB THERAPY RXD/TAKEN: CPT | Mod: CPTII,S$GLB,, | Performed by: SURGERY

## 2021-05-20 PROCEDURE — 99212 PR OFFICE/OUTPT VISIT, EST, LEVL II, 10-19 MIN: ICD-10-PCS | Mod: S$GLB,,, | Performed by: SURGERY

## 2021-05-20 PROCEDURE — 74220 X-RAY XM ESOPHAGUS 1CNTRST: CPT | Mod: TC

## 2021-05-20 PROCEDURE — 4010F PR ACE/ARB THEARPY RXD/TAKEN: ICD-10-PCS | Mod: CPTII,S$GLB,, | Performed by: SURGERY

## 2021-05-20 PROCEDURE — 3080F DIAST BP >= 90 MM HG: CPT | Mod: CPTII,S$GLB,, | Performed by: SURGERY

## 2021-05-20 RX ORDER — ROSUVASTATIN CALCIUM 10 MG/1
10 TABLET, COATED ORAL NIGHTLY
COMMUNITY
Start: 2021-05-12

## 2021-05-20 RX ORDER — AMLODIPINE BESYLATE 5 MG/1
5 TABLET ORAL DAILY
COMMUNITY
Start: 2021-05-12

## 2021-05-20 RX ADMIN — BARIUM SULFATE 290 ML: 0.6 SUSPENSION ORAL at 09:05

## 2021-06-01 ENCOUNTER — HOSPITAL ENCOUNTER (OUTPATIENT)
Dept: RADIOLOGY | Facility: HOSPITAL | Age: 56
Discharge: HOME | End: 2021-06-01
Attending: SURGERY
Payer: COMMERCIAL

## 2021-06-01 DIAGNOSIS — Q89.09 SPLEEN ANOMALY: ICD-10-CM

## 2021-06-01 LAB
CREAT SERPL-MCNC: 0.7 MG/DL (ref 0.5–1.4)
SAMPLE: NORMAL

## 2021-06-01 PROCEDURE — 74183 MRI ABD W/O CNTR FLWD CNTR: CPT | Mod: TC,PO

## 2021-06-01 PROCEDURE — A9585 GADOBUTROL INJECTION: HCPCS | Mod: PO | Performed by: SURGERY

## 2021-06-01 PROCEDURE — 25500020 PHARM REV CODE 255: Mod: PO | Performed by: SURGERY

## 2021-06-01 PROCEDURE — 82565 ASSAY OF CREATININE: CPT | Mod: PO

## 2021-06-01 RX ORDER — GADOBUTROL 604.72 MG/ML
9.5 INJECTION INTRAVENOUS
Status: COMPLETED | OUTPATIENT
Start: 2021-06-01 | End: 2021-06-01

## 2021-06-01 RX ADMIN — GADOBUTROL 9.5 ML: 604.72 INJECTION INTRAVENOUS at 11:06

## 2021-06-09 DIAGNOSIS — K22.9 SUBEPITHELIAL ESOPHAGEAL LESION: Primary | ICD-10-CM

## 2021-06-22 ENCOUNTER — TELEPHONE (OUTPATIENT)
Dept: ENDOSCOPY | Facility: HOSPITAL | Age: 56
End: 2021-06-22

## 2021-07-07 ENCOUNTER — TELEPHONE (OUTPATIENT)
Dept: ENDOSCOPY | Facility: HOSPITAL | Age: 56
End: 2021-07-07

## 2021-07-08 ENCOUNTER — TELEPHONE (OUTPATIENT)
Dept: ENDOSCOPY | Facility: HOSPITAL | Age: 56
End: 2021-07-08

## 2021-07-09 ENCOUNTER — PATIENT MESSAGE (OUTPATIENT)
Dept: ENDOSCOPY | Facility: HOSPITAL | Age: 56
End: 2021-07-09

## 2021-07-12 ENCOUNTER — PATIENT MESSAGE (OUTPATIENT)
Dept: SURGERY | Facility: CLINIC | Age: 56
End: 2021-07-12

## 2021-07-16 ENCOUNTER — TELEPHONE (OUTPATIENT)
Dept: SURGERY | Facility: CLINIC | Age: 56
End: 2021-07-16

## 2021-07-16 DIAGNOSIS — R19.8 ABNORMAL FINDINGS ON ESOPHAGOGASTRODUODENOSCOPY (EGD): Primary | ICD-10-CM

## 2021-07-16 DIAGNOSIS — K22.9 SUBEPITHELIAL ESOPHAGEAL LESION: ICD-10-CM

## 2021-07-19 ENCOUNTER — TELEPHONE (OUTPATIENT)
Dept: ENDOSCOPY | Facility: HOSPITAL | Age: 56
End: 2021-07-19

## 2021-07-27 ENCOUNTER — PATIENT MESSAGE (OUTPATIENT)
Dept: ENDOSCOPY | Facility: HOSPITAL | Age: 56
End: 2021-07-27

## 2021-07-29 ENCOUNTER — PATIENT MESSAGE (OUTPATIENT)
Dept: ENDOSCOPY | Facility: HOSPITAL | Age: 56
End: 2021-07-29

## 2021-08-25 ENCOUNTER — TELEPHONE (OUTPATIENT)
Dept: ENDOSCOPY | Facility: HOSPITAL | Age: 56
End: 2021-08-25

## 2021-09-25 NOTE — PROGRESS NOTES
"  Encounter Date:  2021    Patient ID: Ritika Britt  Age:  56 y.o. :  1965    Chief Complaint:    Chief Complaint   Patient presents with    Consult       History:    Ms. Britt is a 56 y.o. female with a history of uncontrolled diabetes mellitus, HTN, HLD, and fatty liver who presents to clinic for evaluation of an esophageal mass. The patient had an EGD done in Oct 2020 She denies any reflux, but experiences nausea, for which she has begun taking Pantoprazole. Gastric emptying study in 2020 was normal. EGD in Oct 2020 showed a 14 mm submucosal mass in the middle 1/3 of the esophagus. Biopsy was not taken at this time. EGD 21 showed an increase to 17 mm submucosal mass, and FNA at this time showed spindle cells and fibrous tissue with scant material on the cell block showing fibrous tissue and spindle cells with equivocal  staining.      Interval history 21  Esophagram on 21 showed a small hiatal hernia and esophageal dysmotility. Underwent EUS yesterday which redemonstrated esophageal lesion measuring 15mm, 27 cm from the incisors. Biopsy was taken and pathology is pending. Manometry was completed and it was normal.     She feels like she has a fist up under her ribs that she thinks is associated with her splenic mass which was found incidentally on CT for liver. She subsequently had an MRI that showed a lesion in the superior aspect of the spleen that is similar to normal splenic parynchema.  She still has periods of nausea where she dry heaves several times a week that come "out of the blue" that have become more frequent in the last year. Her swallowing has improved since EGD a year ago. She has phenergan and zofran for nausea that she takes once or twice a month. She has early satiety but feels like she gets enough nutrition. She has not lost any weight.      Past Medical History:   Diagnosis Date    Dysphagia     Epigastric pain     Esophagitis     Fatty " liver     Heartburn     Nausea     Submucosal lesion of esophagus      Past Surgical History:   Procedure Laterality Date    COLONOSCOPY      ENDOSCOPIC ULTRASOUND OF UPPER GASTROINTESTINAL TRACT N/A 10/12/2020    Procedure: ULTRASOUND, UPPER GI TRACT, ENDOSCOPIC;  Surgeon: Tree Garsia MD;  Location: [facility] ENDO (2ND FLR);  Service: Endoscopy;  Laterality: N/A;  rapid-arrival 7a-staying at [place]-sister  1 month ago from elhers-danlos syndrome-tb    ENDOSCOPIC ULTRASOUND OF UPPER GASTROINTESTINAL TRACT N/A 2021    Procedure: ULTRASOUND, UPPER GI TRACT, ENDOSCOPIC;  Surgeon: Tree Garsia MD;  Location: [facility] ENDO;  Service: Endoscopy;  Laterality: N/A;  Rapid COVID at 0945. Lives 2.5 hours from nearest testing site. ttr    ESOPHAGOGASTRODUODENOSCOPY      HYSTERECTOMY       Current Outpatient Medications on File Prior to Visit   Medication Sig Dispense Refill    amoxicillin-clavulanate 875-125mg (AUGMENTIN) 875-125 mg per tablet Take 1 tablet by mouth 2 (two) times daily.      fenofibrate (TRICOR) 48 MG tablet Take by mouth.      fenofibrate 40 mg Tab Take by mouth.      furosemide (LASIX) 20 MG tablet Take 20 mg by mouth daily as needed.      insulin glargine,hum.rec.anlog (LANTUS SUBQ) Inject 70 Units into the skin once daily.      insulin lispro 100 unit/mL injection Inject 10 Units into the skin 3 (three) times daily before meals.      lisinopriL (PRINIVIL,ZESTRIL) 20 MG tablet Take 20 mg by mouth once daily.      lisinopriL-hydrochlorothiazide (PRINZIDE,ZESTORETIC) 20-25 mg Tab Take 1 tablet by mouth.      metFORMIN (FORTAMET) 500 mg 24hr tablet Take 500 mg by mouth daily with breakfast.      metFORMIN 500 mg/5 mL Soln Take 500 mg by mouth.      ondansetron (ZOFRAN) 4 MG tablet Take 8 mg by mouth every 8 (eight) hours as needed for Nausea.      OZEMPIC 1 mg/dose (2 mg/1.5 mL) PnIj Inject 1 mg into the skin every 7 days.      pantoprazole (PROTONIX) 20 MG tablet Take 40 mg by mouth 2  (two) times daily before meals.      promethazine (PHENERGAN) 12.5 MG Tab Take 12.5 mg by mouth every 8 (eight) hours as needed.      sucralfate (CARAFATE) 1 gram tablet Take 1 g by mouth 4 (four) times daily.      traMADoL (ULTRAM) 50 mg tablet Take 50 mg by mouth every 12 (twelve) hours as needed for Pain.       Current Facility-Administered Medications on File Prior to Visit   Medication Dose Route Frequency Provider Last Rate Last Admin    [COMPLETED] iohexoL (OMNIPAQUE 350) injection 100 mL  100 mL Intravenous ONCE PRN Nelson Bautista MD   100 mL at 05/11/21 1149    [COMPLETED] iohexoL (OMNIPAQUE 350) injection 15 mL  15 mL Oral ONCE PRN Nelson Bautista MD   15 mL at 05/11/21 1150    [COMPLETED] iohexoL (OMNIPAQUE 350) injection 15 mL  15 mL Oral ONCE PRN Nelson Bautista MD   15 mL at 05/11/21 1150     Review of patient's allergies indicates:  No Known Allergies    Family History:  Her family history includes Cancer in her father and mother; Diabetes in her father and mother; Victoria-Danlos syndrome in her sister; Hypertension in her father and mother.   Mom - breast and lung cancer, dad - prostate cancer, paternal grandmother - ovarian cancer.     Social History:  She reports that she has never smoked. She has never used smokeless tobacco. She reports that she does not drink alcohol and does not use drugs.     ROS:     Review of Systems   Constitutional: Negative for chills and fever.   HENT: Negative for sore throat.    Respiratory: Negative for cough and shortness of breath.    Cardiovascular: Negative for chest pain and palpitations.   Gastrointestinal: Positive for nausea. Negative for constipation, diarrhea and vomiting.   Musculoskeletal: Negative for back pain and neck pain.   Neurological: Negative for dizziness and headaches.   Hematological: Does not bruise/bleed easily.     Pertinent positive/negatives detailed in HPI, all other systems negative.     Physical Exam:  Vitals:     09/28/21 1103   BP: 138/84   Pulse: 104   Temp: 97.8 °F (36.6 °C)       Physical Exam  Constitutional:       General: She is not in acute distress.  HENT:      Head: Normocephalic and atraumatic.   Cardiovascular:      Rate and Rhythm: Normal rate and regular rhythm.   Pulmonary:      Effort: Pulmonary effort is normal. No respiratory distress.   Abdominal:      General: There is no distension.      Palpations: Abdomen is soft.      Tenderness: There is no abdominal tenderness.      Comments: No splenomegaly   Skin:     General: Skin is warm and dry.   Neurological:      General: No focal deficit present.      Mental Status: She is alert and oriented to person, place, and time.   Psychiatric:         Mood and Affect: Mood normal.         Behavior: Behavior normal.         Thought Content: Thought content normal.         Judgment: Judgment normal.           Data:     Radiology:  I personally reviewed these images.  CT Chest Abd and Pelvis with Contrast 5/11/21:  Impression:  There may be some wall thickening in the midesophagus at the level of the adelina though the mass identified at EUS is not definitely seen.  No findings to suggest metastatic disease.    Significant hepatomegaly with diffuse fatty infiltration.     Enlarged lobulated superior aspect of the spleen may represent a 7 cm soft tissue mass though the attenuation is similar to the spleen.  Comparison to prior studies would be most helpful.  If no prior available MRI or perhaps liver-spleen scan may be of benefit.    Endoscopy:    EUS 10/12/20:  ENDOSCOPIC FINDING: :        A single 12 mm submucosal nodule was found in the middle third of        the esophagus, 28 cm from the incisors.        ENDOSONOGRAPHIC FINDING: :        The esophagus, stomach and duodenum and adjacent structures were        visualized endosonographically.        Visualized portions of the liver, left lobe of the liver, spleen,        biliary system and pancreas were normal on  ultrasound examination.        No abnormal-appearing lymph nodes were identified in the mediastinum        or abdomen.        A round intramural (subepithelial) lesion was found in the middle        third of the esophagus. The lesion was hypoechoic. Sonographically,        the origin appeared to be within the muscularis propria (Layer 4).        The mass measured up to 14 mm in thickness. The endosonographic        borders were well-defined.     EUS 4/23/21:   A single 15 mm submucosal nodule was found in the middle third of        the esophagus, 28 cm from the incisors.        The entire examined stomach was endoscopically normal.        The examined duodenum was endoscopically normal.        ENDOSONOGRAPHIC FINDING: :        The esophagus, stomach and duodenum and adjacent structures were        visualized endosonographically.        Visualized portions of the liver, biliary system and pancreas were        normal on ultrasound examination. No abnormal-appearing lymph nodes        were identified in the abdomen.        An oval intramural (subepithelial) lesion was found in the middle        third of the esophagus. It was encountered at 28 cm from the        incisors. The lesion was hypoechoic. Sonographically, the origin        appeared to be within the muscularis propria (Layer 4). The mass        measured up to 17 mm in thickness. The endosonographic borders were        well-defined. Fine needle biopsy was performed. Color Doppler        imaging was utilized prior to needle puncture to confirm a lack of        significant vascular structures within the needle path. Five passes        were made with the 22 gauge ultrasound biopsy needle using a        transesophageal approach. Final cytology results are pending.     Pathology:    FNA 4/23/21:  Esophageal mass, FNA (EUS):   Spindle cells and fibrous tissue (see comment)   Comment:  There is scant material on the cell block showing fibrous tissue   and spindle cells  with equivocal  staining.  There is insufficient   tissue for additional stains and further classification.     EUS 9/27/21  Findings:        ENDOSCOPIC FINDING: :        A single 15 mm submucosal nodule was found in the middle third of        the esophagus, 27 cm from the incisors.        The entire examined stomach was endoscopically normal.        The examined duodenum was endoscopically normal.        ENDOSONOGRAPHIC FINDING: :        The esophagus, stomach and duodenum and adjacent structures were        visualized endosonographically.        Visualized portions of the left lobe of the liver, left kidney,        biliary system and pancreas were normal on ultrasound examination.        No abnormal-appearing lymph nodes were identified in the mediastinum        or abdomen.        A round intramural (subepithelial) lesion was found in the middle        third of the esophagus. It was encountered at 27 cm from the        incisors. The lesion was hypoechoic. Sonographically, the origin        appeared to be within the muscularis propria (Layer 4). The mass        measured up to 15 mm in thickness. The endosonographic borders were        well-defined. Fine needle biopsy was performed. Color Doppler        imaging was utilized prior to needle puncture to confirm a lack of        significant vascular structures within the needle path. Five passes        were made with the 22 gauge ultrasound biopsy needle using a        transesophageal approach. The cellularity of the specimen was        adequate. Final cytology results are pending.   Impression:            - Submucosal nodule found in the esophagus.                          - Normal stomach.                          - Normal examined duodenum.                          - An intramural (subepithelial) lesion was found                          in the middle third of the esophagus. It appeared                          to originate from within the muscularis propria                           (Layer 4). Fine needle biopsy performed.   Esophagram 5/20/21  Impression:     Mild esophageal dysmotility.     Small hiatal hernia.    Abd MRI 6/2021  IMPRESSION:  1. 6.7 cm well marginated round solid enhancing lesion at the superior aspect of the spleen, which has signal characteristics and enhancement pattern similar to normal splenic parenchyma. Benign etiology is favored, possibly hamartoma or lymphangioma. Malignancy is not absolutely excluded but felt less likely. In the absence of any prior studies which could confirm stability, short-term follow-up with repeat MRI in 3-6 months is recommended.  2. Hepatic steatosis.    Manometry      Assessment:   56 year old female with persistent nausea and esophageal mass that has had an interval decrease in size.    Plan:  - CT abd/pelvis  - Obtain outside imaging  - Follow up after imaging is obtained

## 2021-09-27 ENCOUNTER — ANESTHESIA (OUTPATIENT)
Dept: ENDOSCOPY | Facility: HOSPITAL | Age: 56
End: 2021-09-27
Payer: COMMERCIAL

## 2021-09-27 ENCOUNTER — HOSPITAL ENCOUNTER (OUTPATIENT)
Facility: HOSPITAL | Age: 56
Discharge: HOME | End: 2021-09-27
Attending: INTERNAL MEDICINE | Admitting: INTERNAL MEDICINE
Payer: COMMERCIAL

## 2021-09-27 ENCOUNTER — ANESTHESIA EVENT (OUTPATIENT)
Dept: ENDOSCOPY | Facility: HOSPITAL | Age: 56
End: 2021-09-27
Payer: COMMERCIAL

## 2021-09-27 VITALS
DIASTOLIC BLOOD PRESSURE: 71 MMHG | WEIGHT: 214 LBS | HEART RATE: 84 BPM | OXYGEN SATURATION: 96 % | BODY MASS INDEX: 36.54 KG/M2 | TEMPERATURE: 98 F | HEIGHT: 64 IN | SYSTOLIC BLOOD PRESSURE: 119 MMHG | RESPIRATION RATE: 19 BRPM

## 2021-09-27 DIAGNOSIS — K22.9 SUBEPITHELIAL ESOPHAGEAL LESION: ICD-10-CM

## 2021-09-27 LAB
POCT GLUCOSE: 148 MG/DL (ref 70–110)
POCT GLUCOSE: 225 MG/DL (ref 70–110)
SARS-COV-2 RDRP RESP QL NAA+PROBE: NEGATIVE

## 2021-09-27 PROCEDURE — D9220A PRA ANESTHESIA: ICD-10-PCS | Mod: ANES,,, | Performed by: ANESTHESIOLOGY

## 2021-09-27 PROCEDURE — 27202131 HC NEEDLE, FNB SINGLE (ANY): Performed by: INTERNAL MEDICINE

## 2021-09-27 PROCEDURE — 88342 CHG IMMUNOCYTOCHEMISTRY: ICD-10-PCS | Mod: 26,,, | Performed by: PATHOLOGY

## 2021-09-27 PROCEDURE — 88342 IMHCHEM/IMCYTCHM 1ST ANTB: CPT | Performed by: PATHOLOGY

## 2021-09-27 PROCEDURE — 43242 EGD US FINE NEEDLE BX/ASPIR: CPT | Mod: ,,, | Performed by: INTERNAL MEDICINE

## 2021-09-27 PROCEDURE — 88172 CYTP DX EVAL FNA 1ST EA SITE: CPT | Mod: 26,,, | Performed by: PATHOLOGY

## 2021-09-27 PROCEDURE — 88177 PR  EVALUATION OF FNA SMEAR TO DETERMINE ADEQUACY, EA ADD EVAL: ICD-10-PCS | Mod: 26,,, | Performed by: PATHOLOGY

## 2021-09-27 PROCEDURE — D9220A PRA ANESTHESIA: Mod: ANES,,, | Performed by: ANESTHESIOLOGY

## 2021-09-27 PROCEDURE — 88173 PR  INTERPRETATION OF FNA SMEAR: ICD-10-PCS | Mod: 26,,, | Performed by: PATHOLOGY

## 2021-09-27 PROCEDURE — 88342 IMHCHEM/IMCYTCHM 1ST ANTB: CPT | Mod: 26,,, | Performed by: PATHOLOGY

## 2021-09-27 PROCEDURE — 88341 PR IHC OR ICC EACH ADD'L SINGLE ANTIBODY  STAINPR: ICD-10-PCS | Mod: 26,,, | Performed by: PATHOLOGY

## 2021-09-27 PROCEDURE — D9220A PRA ANESTHESIA: ICD-10-PCS | Mod: CRNA,,, | Performed by: NURSE ANESTHETIST, CERTIFIED REGISTERED

## 2021-09-27 PROCEDURE — 88177 CYTP FNA EVAL EA ADDL: CPT | Mod: 26,,, | Performed by: PATHOLOGY

## 2021-09-27 PROCEDURE — 88173 CYTOPATH EVAL FNA REPORT: CPT | Mod: 26,,, | Performed by: PATHOLOGY

## 2021-09-27 PROCEDURE — 37000008 HC ANESTHESIA 1ST 15 MINUTES: Performed by: INTERNAL MEDICINE

## 2021-09-27 PROCEDURE — 88172 PR  EVALUATION OF FNA SMEAR TO DETERMINE ADEQUACY, FIRST EVAL: ICD-10-PCS | Mod: 26,,, | Performed by: PATHOLOGY

## 2021-09-27 PROCEDURE — U0002 COVID-19 LAB TEST NON-CDC: HCPCS | Performed by: INTERNAL MEDICINE

## 2021-09-27 PROCEDURE — 63600175 PHARM REV CODE 636 W HCPCS: Performed by: NURSE ANESTHETIST, CERTIFIED REGISTERED

## 2021-09-27 PROCEDURE — 88305 TISSUE EXAM BY PATHOLOGIST: ICD-10-PCS | Mod: 26,,, | Performed by: PATHOLOGY

## 2021-09-27 PROCEDURE — 88305 TISSUE EXAM BY PATHOLOGIST: CPT | Performed by: PATHOLOGY

## 2021-09-27 PROCEDURE — 43242 PR UPGI ENDOSCOPY,FN NEEDLE BX,GUIDED: ICD-10-PCS | Mod: ,,, | Performed by: INTERNAL MEDICINE

## 2021-09-27 PROCEDURE — 82962 GLUCOSE BLOOD TEST: CPT | Performed by: INTERNAL MEDICINE

## 2021-09-27 PROCEDURE — 25000003 PHARM REV CODE 250: Performed by: NURSE ANESTHETIST, CERTIFIED REGISTERED

## 2021-09-27 PROCEDURE — 88177 CYTP FNA EVAL EA ADDL: CPT | Performed by: PATHOLOGY

## 2021-09-27 PROCEDURE — 88341 IMHCHEM/IMCYTCHM EA ADD ANTB: CPT | Mod: 26,,, | Performed by: PATHOLOGY

## 2021-09-27 PROCEDURE — 43242 EGD US FINE NEEDLE BX/ASPIR: CPT | Performed by: INTERNAL MEDICINE

## 2021-09-27 PROCEDURE — 88173 CYTOPATH EVAL FNA REPORT: CPT | Performed by: PATHOLOGY

## 2021-09-27 PROCEDURE — 37000009 HC ANESTHESIA EA ADD 15 MINS: Performed by: INTERNAL MEDICINE

## 2021-09-27 PROCEDURE — 88305 TISSUE EXAM BY PATHOLOGIST: CPT | Mod: 26,,, | Performed by: PATHOLOGY

## 2021-09-27 PROCEDURE — D9220A PRA ANESTHESIA: Mod: CRNA,,, | Performed by: NURSE ANESTHETIST, CERTIFIED REGISTERED

## 2021-09-27 PROCEDURE — 88341 IMHCHEM/IMCYTCHM EA ADD ANTB: CPT | Performed by: PATHOLOGY

## 2021-09-27 PROCEDURE — 25000003 PHARM REV CODE 250: Performed by: INTERNAL MEDICINE

## 2021-09-27 PROCEDURE — 88172 CYTP DX EVAL FNA 1ST EA SITE: CPT | Performed by: PATHOLOGY

## 2021-09-27 RX ORDER — SODIUM CHLORIDE 9 MG/ML
INJECTION, SOLUTION INTRAVENOUS CONTINUOUS
Status: DISCONTINUED | OUTPATIENT
Start: 2021-09-27 | End: 2021-09-27 | Stop reason: HOSPADM

## 2021-09-27 RX ORDER — PROPOFOL 10 MG/ML
VIAL (ML) INTRAVENOUS
Status: DISCONTINUED | OUTPATIENT
Start: 2021-09-27 | End: 2021-09-27

## 2021-09-27 RX ORDER — PROPOFOL 10 MG/ML
VIAL (ML) INTRAVENOUS CONTINUOUS PRN
Status: DISCONTINUED | OUTPATIENT
Start: 2021-09-27 | End: 2021-09-27

## 2021-09-27 RX ORDER — SODIUM CHLORIDE 0.9 % (FLUSH) 0.9 %
10 SYRINGE (ML) INJECTION
Status: DISCONTINUED | OUTPATIENT
Start: 2021-09-27 | End: 2021-09-27 | Stop reason: HOSPADM

## 2021-09-27 RX ORDER — LIDOCAINE HYDROCHLORIDE 20 MG/ML
INJECTION INTRAVENOUS
Status: DISCONTINUED | OUTPATIENT
Start: 2021-09-27 | End: 2021-09-27

## 2021-09-27 RX ORDER — FENTANYL CITRATE 50 UG/ML
INJECTION, SOLUTION INTRAMUSCULAR; INTRAVENOUS
Status: DISCONTINUED | OUTPATIENT
Start: 2021-09-27 | End: 2021-09-27

## 2021-09-27 RX ADMIN — PROPOFOL 50 MG: 10 INJECTION, EMULSION INTRAVENOUS at 09:09

## 2021-09-27 RX ADMIN — Medication 200 MCG/KG/MIN: at 09:09

## 2021-09-27 RX ADMIN — SODIUM CHLORIDE: 0.9 INJECTION, SOLUTION INTRAVENOUS at 09:09

## 2021-09-27 RX ADMIN — PROPOFOL 20 MG: 10 INJECTION, EMULSION INTRAVENOUS at 09:09

## 2021-09-27 RX ADMIN — PROPOFOL 30 MG: 10 INJECTION, EMULSION INTRAVENOUS at 09:09

## 2021-09-27 RX ADMIN — FENTANYL CITRATE 25 MCG: 50 INJECTION, SOLUTION INTRAMUSCULAR; INTRAVENOUS at 09:09

## 2021-09-27 RX ADMIN — LIDOCAINE HYDROCHLORIDE 100 MG: 20 INJECTION, SOLUTION INTRAVENOUS at 09:09

## 2021-09-28 ENCOUNTER — HOSPITAL ENCOUNTER (OUTPATIENT)
Facility: HOSPITAL | Age: 56
Discharge: HOME | End: 2021-09-28
Attending: INTERNAL MEDICINE | Admitting: INTERNAL MEDICINE
Payer: COMMERCIAL

## 2021-09-28 ENCOUNTER — OFFICE VISIT (OUTPATIENT)
Dept: SURGERY | Facility: CLINIC | Age: 56
End: 2021-09-28
Payer: COMMERCIAL

## 2021-09-28 VITALS
OXYGEN SATURATION: 95 % | SYSTOLIC BLOOD PRESSURE: 145 MMHG | WEIGHT: 214 LBS | RESPIRATION RATE: 15 BRPM | HEART RATE: 85 BPM | BODY MASS INDEX: 36.54 KG/M2 | TEMPERATURE: 98 F | DIASTOLIC BLOOD PRESSURE: 66 MMHG | HEIGHT: 64 IN

## 2021-09-28 VITALS
BODY MASS INDEX: 36.67 KG/M2 | DIASTOLIC BLOOD PRESSURE: 84 MMHG | SYSTOLIC BLOOD PRESSURE: 138 MMHG | HEART RATE: 104 BPM | WEIGHT: 214.81 LBS | HEIGHT: 64 IN | TEMPERATURE: 98 F

## 2021-09-28 DIAGNOSIS — K22.9 ESOPHAGEAL LESION: ICD-10-CM

## 2021-09-28 DIAGNOSIS — K22.9 ESOPHAGEAL LESION: Primary | ICD-10-CM

## 2021-09-28 LAB — POCT GLUCOSE: 258 MG/DL (ref 70–110)

## 2021-09-28 PROCEDURE — 3075F SYST BP GE 130 - 139MM HG: CPT | Mod: CPTII,S$GLB,, | Performed by: SURGERY

## 2021-09-28 PROCEDURE — 3079F DIAST BP 80-89 MM HG: CPT | Mod: CPTII,S$GLB,, | Performed by: SURGERY

## 2021-09-28 PROCEDURE — 3008F PR BODY MASS INDEX (BMI) DOCUMENTED: ICD-10-PCS | Mod: CPTII,S$GLB,, | Performed by: SURGERY

## 2021-09-28 PROCEDURE — 1159F MED LIST DOCD IN RCRD: CPT | Mod: CPTII,S$GLB,, | Performed by: SURGERY

## 2021-09-28 PROCEDURE — 4010F PR ACE/ARB THEARPY RXD/TAKEN: ICD-10-PCS | Mod: CPTII,S$GLB,, | Performed by: SURGERY

## 2021-09-28 PROCEDURE — 99999 PR PBB SHADOW E&M-EST. PATIENT-LVL IV: CPT | Mod: PBBFAC,,, | Performed by: SURGERY

## 2021-09-28 PROCEDURE — 99999 PR PBB SHADOW E&M-EST. PATIENT-LVL IV: ICD-10-PCS | Mod: PBBFAC,,, | Performed by: SURGERY

## 2021-09-28 PROCEDURE — 3075F PR MOST RECENT SYSTOLIC BLOOD PRESS GE 130-139MM HG: ICD-10-PCS | Mod: CPTII,S$GLB,, | Performed by: SURGERY

## 2021-09-28 PROCEDURE — 91037 ESOPH IMPED FUNCTION TEST: CPT | Performed by: INTERNAL MEDICINE

## 2021-09-28 PROCEDURE — 91010 ESOPHAGUS MOTILITY STUDY: CPT | Performed by: INTERNAL MEDICINE

## 2021-09-28 PROCEDURE — 91037 ESOPH IMPED FUNCTION TEST: CPT | Mod: 26,,, | Performed by: INTERNAL MEDICINE

## 2021-09-28 PROCEDURE — 91010 PR ESOPHAGEAL MOTILITY STUDY, MA2METRY: ICD-10-PCS | Mod: 26,,, | Performed by: INTERNAL MEDICINE

## 2021-09-28 PROCEDURE — 99213 OFFICE O/P EST LOW 20 MIN: CPT | Mod: S$GLB,,, | Performed by: SURGERY

## 2021-09-28 PROCEDURE — 25000003 PHARM REV CODE 250: Performed by: INTERNAL MEDICINE

## 2021-09-28 PROCEDURE — 91010 ESOPHAGUS MOTILITY STUDY: CPT | Mod: 26,,, | Performed by: INTERNAL MEDICINE

## 2021-09-28 PROCEDURE — 91037 PR GERD TST W/ NASAL IMPEDENCE ELECTROD: ICD-10-PCS | Mod: 26,,, | Performed by: INTERNAL MEDICINE

## 2021-09-28 PROCEDURE — 99213 PR OFFICE/OUTPT VISIT, EST, LEVL III, 20-29 MIN: ICD-10-PCS | Mod: S$GLB,,, | Performed by: SURGERY

## 2021-09-28 PROCEDURE — 4010F ACE/ARB THERAPY RXD/TAKEN: CPT | Mod: CPTII,S$GLB,, | Performed by: SURGERY

## 2021-09-28 PROCEDURE — 3008F BODY MASS INDEX DOCD: CPT | Mod: CPTII,S$GLB,, | Performed by: SURGERY

## 2021-09-28 PROCEDURE — 3079F PR MOST RECENT DIASTOLIC BLOOD PRESSURE 80-89 MM HG: ICD-10-PCS | Mod: CPTII,S$GLB,, | Performed by: SURGERY

## 2021-09-28 PROCEDURE — 1159F PR MEDICATION LIST DOCUMENTED IN MEDICAL RECORD: ICD-10-PCS | Mod: CPTII,S$GLB,, | Performed by: SURGERY

## 2021-09-28 RX ORDER — METFORMIN HYDROCHLORIDE 1000 MG/1
TABLET ORAL
COMMUNITY
Start: 2021-08-12

## 2021-09-28 RX ORDER — GLIMEPIRIDE 4 MG/1
TABLET ORAL
COMMUNITY
Start: 2021-08-17

## 2021-09-28 RX ORDER — FAMOTIDINE 20 MG/1
TABLET, FILM COATED ORAL
COMMUNITY
Start: 2021-06-23

## 2021-09-28 RX ORDER — SEMAGLUTIDE 1.34 MG/ML
INJECTION, SOLUTION SUBCUTANEOUS
COMMUNITY
Start: 2021-08-12

## 2021-09-28 RX ORDER — LIDOCAINE HYDROCHLORIDE 20 MG/ML
JELLY TOPICAL ONCE
Status: COMPLETED | OUTPATIENT
Start: 2021-09-28 | End: 2021-09-28

## 2021-09-28 RX ORDER — INSULIN GLARGINE 100 [IU]/ML
INJECTION, SOLUTION SUBCUTANEOUS
COMMUNITY
Start: 2021-08-12

## 2021-09-28 RX ADMIN — LIDOCAINE HYDROCHLORIDE 10 ML: 20 JELLY TOPICAL at 07:09

## 2021-09-29 LAB
ADEQUACY: NORMAL
FINAL PATHOLOGIC DIAGNOSIS: NORMAL
Lab: NORMAL

## 2021-10-12 ENCOUNTER — HOSPITAL ENCOUNTER (OUTPATIENT)
Dept: RADIOLOGY | Facility: HOSPITAL | Age: 56
Discharge: HOME | End: 2021-10-12
Attending: SURGERY
Payer: COMMERCIAL

## 2021-10-12 DIAGNOSIS — K22.9 ESOPHAGEAL LESION: ICD-10-CM

## 2021-10-12 LAB
CREAT SERPL-MCNC: 0.7 MG/DL (ref 0.5–1.4)
SAMPLE: NORMAL

## 2021-10-12 PROCEDURE — 25500020 PHARM REV CODE 255: Mod: PO | Performed by: SURGERY

## 2021-10-12 PROCEDURE — 74160 CT ABDOMEN W/CONTRAST: CPT | Mod: TC,PO

## 2021-10-12 RX ADMIN — IOHEXOL 100 ML: 350 INJECTION, SOLUTION INTRAVENOUS at 01:10

## 2021-10-26 ENCOUNTER — PATIENT MESSAGE (OUTPATIENT)
Dept: SURGERY | Facility: CLINIC | Age: 56
End: 2021-10-26
Payer: COMMERCIAL

## 2021-11-02 ENCOUNTER — TELEPHONE (OUTPATIENT)
Dept: SURGERY | Facility: CLINIC | Age: 56
End: 2021-11-02
Payer: COMMERCIAL

## 2021-11-02 DIAGNOSIS — K22.9 ESOPHAGEAL LESION: Primary | ICD-10-CM

## 2021-11-03 ENCOUNTER — TELEPHONE (OUTPATIENT)
Dept: GASTROENTEROLOGY | Facility: CLINIC | Age: 56
End: 2021-11-03
Payer: COMMERCIAL

## 2021-11-16 ENCOUNTER — TELEPHONE (OUTPATIENT)
Dept: GASTROENTEROLOGY | Facility: CLINIC | Age: 56
End: 2021-11-16
Payer: COMMERCIAL

## 2021-11-22 ENCOUNTER — TELEPHONE (OUTPATIENT)
Dept: GASTROENTEROLOGY | Facility: CLINIC | Age: 56
End: 2021-11-22
Payer: COMMERCIAL

## 2022-08-05 DIAGNOSIS — K22.9 ESOPHAGEAL LESION: Primary | ICD-10-CM

## 2023-01-17 ENCOUNTER — ANESTHESIA EVENT (OUTPATIENT)
Dept: ENDOSCOPY | Facility: HOSPITAL | Age: 58
End: 2023-01-17
Payer: COMMERCIAL

## 2023-01-17 ENCOUNTER — ANESTHESIA (OUTPATIENT)
Dept: ENDOSCOPY | Facility: HOSPITAL | Age: 58
End: 2023-01-17
Payer: COMMERCIAL

## 2023-01-17 ENCOUNTER — HOSPITAL ENCOUNTER (OUTPATIENT)
Facility: HOSPITAL | Age: 58
Discharge: HOME | End: 2023-01-17
Attending: INTERNAL MEDICINE | Admitting: INTERNAL MEDICINE
Payer: COMMERCIAL

## 2023-01-17 VITALS
WEIGHT: 220 LBS | SYSTOLIC BLOOD PRESSURE: 109 MMHG | HEART RATE: 76 BPM | DIASTOLIC BLOOD PRESSURE: 59 MMHG | RESPIRATION RATE: 19 BRPM | HEIGHT: 64 IN | BODY MASS INDEX: 37.56 KG/M2 | TEMPERATURE: 98 F | OXYGEN SATURATION: 95 %

## 2023-01-17 DIAGNOSIS — K22.9 SUBEPITHELIAL ESOPHAGEAL LESION: ICD-10-CM

## 2023-01-17 LAB — POCT GLUCOSE: 149 MG/DL (ref 70–110)

## 2023-01-17 PROCEDURE — 43259 EGD US EXAM DUODENUM/JEJUNUM: CPT | Mod: ,,, | Performed by: INTERNAL MEDICINE

## 2023-01-17 PROCEDURE — 25000003 PHARM REV CODE 250: Performed by: NURSE ANESTHETIST, CERTIFIED REGISTERED

## 2023-01-17 PROCEDURE — D9220A PRA ANESTHESIA: ICD-10-PCS | Mod: ANES,,, | Performed by: ANESTHESIOLOGY

## 2023-01-17 PROCEDURE — D9220A PRA ANESTHESIA: Mod: ANES,,, | Performed by: ANESTHESIOLOGY

## 2023-01-17 PROCEDURE — D9220A PRA ANESTHESIA: ICD-10-PCS | Mod: CRNA,,, | Performed by: NURSE ANESTHETIST, CERTIFIED REGISTERED

## 2023-01-17 PROCEDURE — 43259 PR ENDOSCOPIC ULTRASOUND EXAM: ICD-10-PCS | Mod: ,,, | Performed by: INTERNAL MEDICINE

## 2023-01-17 PROCEDURE — 37000008 HC ANESTHESIA 1ST 15 MINUTES: Performed by: INTERNAL MEDICINE

## 2023-01-17 PROCEDURE — D9220A PRA ANESTHESIA: Mod: CRNA,,, | Performed by: NURSE ANESTHETIST, CERTIFIED REGISTERED

## 2023-01-17 PROCEDURE — 43259 EGD US EXAM DUODENUM/JEJUNUM: CPT | Performed by: INTERNAL MEDICINE

## 2023-01-17 PROCEDURE — 63600175 PHARM REV CODE 636 W HCPCS: Performed by: NURSE ANESTHETIST, CERTIFIED REGISTERED

## 2023-01-17 RX ORDER — PROPOFOL 10 MG/ML
VIAL (ML) INTRAVENOUS
Status: DISCONTINUED | OUTPATIENT
Start: 2023-01-17 | End: 2023-01-17

## 2023-01-17 RX ORDER — SODIUM CHLORIDE 9 MG/ML
INJECTION, SOLUTION INTRAVENOUS CONTINUOUS
Status: DISCONTINUED | OUTPATIENT
Start: 2023-01-17 | End: 2023-01-17 | Stop reason: HOSPADM

## 2023-01-17 RX ORDER — SODIUM CHLORIDE 0.9 % (FLUSH) 0.9 %
10 SYRINGE (ML) INJECTION
Status: DISCONTINUED | OUTPATIENT
Start: 2023-01-17 | End: 2023-01-17 | Stop reason: HOSPADM

## 2023-01-17 RX ORDER — LIDOCAINE HYDROCHLORIDE 20 MG/ML
INJECTION INTRAVENOUS
Status: DISCONTINUED | OUTPATIENT
Start: 2023-01-17 | End: 2023-01-17

## 2023-01-17 RX ADMIN — PROPOFOL 100 MG: 10 INJECTION, EMULSION INTRAVENOUS at 07:01

## 2023-01-17 RX ADMIN — LIDOCAINE HYDROCHLORIDE 100 MG: 20 INJECTION INTRAVENOUS at 07:01

## 2023-01-17 RX ADMIN — PROPOFOL 50 MG: 10 INJECTION, EMULSION INTRAVENOUS at 07:01

## 2023-01-17 RX ADMIN — SODIUM CHLORIDE: 9 INJECTION, SOLUTION INTRAVENOUS at 07:01

## 2023-01-17 NOTE — H&P
Short Stay Endoscopy History and Physical    PCP - Quang Conway MD  Referring Physician - Tree Garsia MD  [address]  [address]  [place] [address]    Procedure - eus  ASA - per anesthesia  Mallampati - per anesthesia  History of Anesthesia problems - no  Family history Anesthesia problems -  no   Plan of anesthesia - General    HPI:  This is a 58 y.o. female here for evaluation of: esophageal lesion      Reflux - no  Dysphagia - no  Abdominal pain - no  Diarrhea - no    ROS:  Constitutional: No fevers, chills, No weight loss  CV: No chest pain  Pulm: No cough, No shortness of breath  Ophtho: No vision changes  GI: see HPI  Derm: No rash    Medical History:  has a past medical history of Dysphagia, Epigastric pain, Esophagitis, Fatty liver, Heartburn, Nausea, and Submucosal lesion of esophagus.    Surgical History:  has a past surgical history that includes Hysterectomy; Esophagogastroduodenoscopy; Colonoscopy; Endoscopic ultrasound of upper gastrointestinal tract (N/A, 10/12/2020); Endoscopic ultrasound of upper gastrointestinal tract (N/A, 4/23/2021); Endoscopic ultrasound of upper gastrointestinal tract (N/A, 9/27/2021); Esophagogastroduodenoscopy (N/A, 9/27/2021); and Esophageal manometry with measurement of impedance (N/A, 9/28/2021).    Family History: family history includes Cancer in her father and mother; Diabetes in her father and mother; Victoria-Danlos syndrome in her sister; Hypertension in her father and mother..    Social History:  reports that she has never smoked. She has never used smokeless tobacco. She reports that she does not drink alcohol and does not use drugs.    Review of patient's allergies indicates:   Allergen Reactions    Hydrocodone-acetaminophen Nausea And Vomiting       Medications:   Medications Prior to Admission   Medication Sig Dispense Refill Last Dose    glimepiride (AMARYL) 4 MG tablet    1/16/2023    lisinopriL-hydrochlorothiazide (PRINZIDE,ZESTORETIC)  20-25 mg Tab Take 1 tablet by mouth.   1/17/2023    metFORMIN (GLUCOPHAGE) 1000 MG tablet    1/16/2023    rosuvastatin (CRESTOR) 10 MG tablet Take 10 mg by mouth nightly.   1/16/2023    amLODIPine (NORVASC) 5 MG tablet Take 5 mg by mouth once daily. PATIENT ONLY TAKES WHEN BLOOD PRESSURE IS HIGH.       amoxicillin-clavulanate 875-125mg (AUGMENTIN) 875-125 mg per tablet Take 1 tablet by mouth 2 (two) times daily.       famotidine (PEPCID) 20 MG tablet 1 TAB(S) ORAL TWO TIMES A DAY       fenofibrate (TRICOR) 48 MG tablet Take by mouth.       fenofibrate 40 mg Tab Take by mouth.       furosemide (LASIX) 20 MG tablet Take 20 mg by mouth daily as needed.       insulin lispro 100 unit/mL injection Inject 10 Units into the skin 3 (three) times daily before meals.       LANTUS SOLOSTAR U-100 INSULIN glargine 100 units/mL (3mL) SubQ pen        lisinopriL (PRINIVIL,ZESTRIL) 20 MG tablet Take 20 mg by mouth once daily.       ondansetron (ZOFRAN) 4 MG tablet Take 8 mg by mouth every 8 (eight) hours as needed for Nausea.       OZEMPIC 1 mg/dose (4 mg/3 mL)        pantoprazole (PROTONIX) 20 MG tablet Take 40 mg by mouth 2 (two) times daily before meals.       promethazine (PHENERGAN) 12.5 MG Tab Take 12.5 mg by mouth every 8 (eight) hours as needed.       sucralfate (CARAFATE) 1 gram tablet Take 1 g by mouth 4 (four) times daily.       traMADoL (ULTRAM) 50 mg tablet Take 50 mg by mouth every 12 (twelve) hours as needed for Pain.          Physical Exam:    Vital Signs:   Vitals:    01/17/23 0729   BP: (!) 148/80   Pulse: 88   Resp: 18   Temp: 97.9 °F (36.6 °C)       General Appearance: Well appearing in no acute distress    Labs:  No results found for: WBC, HGB, HCT, PLT, CHOL, TRIG, HDL, LDLDIRECT, ALT, AST, NA, K, CL, CREATININE, BUN, CO2, TSH, PSA, INR, GLUF, HGBA1C, MICROALBUR    I have explained the risks and benefits of this endoscopic procedure to the patient including but not limited to bleeding, inflammation, infection,  perforation, and death.      Tree Garsia MD  eus

## 2023-01-17 NOTE — PLAN OF CARE
Patient tolerated procedure and anesthesia with no complaints of pain or nausea. Discharge material given and explained to patient and friend. Both verbalized understanding. Patient wheeled to ride in stable condition with no complaints.

## 2023-01-17 NOTE — PROVATION PATIENT INSTRUCTIONS
Discharge Summary/Instructions after an Endoscopic Procedure  Patient Name: Ritika Roldan  Patient MRN: [id number]  Patient YOB: 1965 Tuesday, January 17, 2023  Tree Garsia MD  Dear patient,  As a result of recent federal legislation (The Federal Cures Act), you may   receive lab or pathology results from your procedure in your [facility]   account before your physician is able to contact you. Your physician or   their representative will relay the results to you with their   recommendations at their soonest availability.  Thank you,  RESTRICTIONS:  During your procedure today, you received medications for sedation.  These   medications may affect your judgment, balance and coordination.  Therefore,   for 24 hours, you have the following restrictions:   - DO NOT drive a car, operate machinery, make legal/financial decisions,   sign important papers or drink alcohol.    ACTIVITY:  Today: no heavy lifting, straining or running due to procedural   sedation/anesthesia.  The following day: return to full activity including work.  DIET:  Eat and drink normally unless instructed otherwise.     TREATMENT FOR COMMON SIDE EFFECTS:  - Mild abdominal pain, nausea, belching, bloating or excessive gas:  rest,   eat lightly and use a heating pad.  - Sore Throat: treat with throat lozenges and/or gargle with warm salt   water.  - Because air was used during the procedure, expelling large amounts of air   from your rectum or belching is normal.  - If a bowel prep was taken, you may not have a bowel movement for 1-3 days.    This is normal.  SYMPTOMS TO WATCH FOR AND REPORT TO YOUR PHYSICIAN:  1. Abdominal pain or bloating, other than gas cramps.  2. Chest pain.  3. Back pain.  4. Signs of infection such as: chills or fever occurring within 24 hours   after the procedure.  5. Rectal bleeding, which would show as bright red, maroon, or black stools.   (A tablespoon of blood from the rectum is not serious, especially if    hemorrhoids are present.)  6. Vomiting.  7. Weakness or dizziness.  GO DIRECTLY TO THE NEAREST EMERGENCY ROOM IF YOU HAVE ANY OF THE FOLLOWING:      Difficulty breathing              Chills and/or fever over 101 F   Persistent vomiting and/or vomiting blood   Severe abdominal pain   Severe chest pain   Black, tarry stools   Bleeding- more than one tablespoon   Any other symptom or condition that you feel may need urgent attention  Your doctor recommends these additional instructions:  If any biopsies were taken, your doctors clinic will contact you in 1 to 2   weeks with any results.  - Discharge patient to home.   - Resume previous diet.   - Continue present medications.   - Repeat the upper endoscopic ultrasound in 2 years for surveillance.  For questions, problems or results please call your physician - Tree Garsia MD at Work:  [phone].  [facility], EMERGENCY ROOM PHONE NUMBER: [phone]  IF A COMPLICATION OR EMERGENCY SITUATION ARISES AND YOU ARE UNABLE TO REACH   YOUR PHYSICIAN - GO DIRECTLY TO THE EMERGENCY ROOM.  Tree Garsia MD  1/17/2023 8:07:15 AM  This report has been verified and signed electronically.  Dear patient,  As a result of recent federal legislation (The Federal Cures Act), you may   receive lab or pathology results from your procedure in your [facility]   account before your physician is able to contact you. Your physician or   their representative will relay the results to you with their   recommendations at their soonest availability.  Thank you,  PROVATION

## 2023-01-17 NOTE — ANESTHESIA RELEASE NOTE
"Anesthesia Release from PACU Note    Patient: Ritika Britt    Procedure(s) Performed: Procedure(s) (LRB):  ULTRASOUND, UPPER GI TRACT, ENDOSCOPIC (N/A)    Anesthesia type: general    Post pain: Adequate analgesia    Post assessment: no apparent anesthetic complications    Last Vitals:   Visit Vitals  BP (!) 109/59   Pulse 76   Temp 36.4 °C (97.6 °F) (Temporal)   Resp 19   Ht 5' 4" (1.626 m)   Wt 99.8 kg (220 lb)   SpO2 95%   Breastfeeding No   BMI 37.76 kg/m²       Post vital signs: stable    Level of consciousness: awake    Nausea/Vomiting: no nausea/no vomiting    Complications: none    Airway Patency: patent    Respiratory: unassisted    Cardiovascular: stable and blood pressure at baseline    Hydration: euvolemic  "

## 2023-01-17 NOTE — ANESTHESIA POSTPROCEDURE EVALUATION
Anesthesia Post Evaluation    Patient: Ritika Britt    Procedure(s) Performed: Procedure(s) (LRB):  ULTRASOUND, UPPER GI TRACT, ENDOSCOPIC (N/A)    Final Anesthesia Type: general (Natural airway)      Patient location during evaluation: [facility]  Patient participation: Yes- Able to Participate  Level of consciousness: awake and alert  Post-procedure vital signs: reviewed and stable  Pain management: adequate  Airway patency: patent    PONV status at discharge: No PONV  Anesthetic complications: no      Cardiovascular status: hemodynamically stable  Respiratory status: unassisted  Hydration status: euvolemic  Follow-up not needed.          Vitals Value Taken Time   /59 01/17/23 0845   Temp 36.4 °C (97.6 °F) 01/17/23 0806   Pulse 76 01/17/23 0845   Resp 19 01/17/23 0845   SpO2 95 % 01/17/23 0845         No case tracking events are documented in the log.      Pain/Yi Score: Yi Score: 10 (1/17/2023  8:45 AM)

## 2023-01-17 NOTE — ANESTHESIA PREPROCEDURE EVALUATION
01/17/2023  Ritika Britt is a 58 y.o., female.    Pre-operative evaluation for Procedure(s) (LRB):  ULTRASOUND, UPPER GI TRACT, ENDOSCOPIC (N/A)    Ritkia Britt is a 58 y.o. female     Patient Active Problem List   Diagnosis    Subepithelial esophageal lesion    Abnormal findings on esophagogastroduodenoscopy (EGD)    Esophageal lesion       Review of patient's allergies indicates:   Allergen Reactions    Hydrocodone-acetaminophen Nausea And Vomiting       No current facility-administered medications on file prior to encounter.     Current Outpatient Medications on File Prior to Encounter   Medication Sig Dispense Refill    glimepiride (AMARYL) 4 MG tablet       lisinopriL-hydrochlorothiazide (PRINZIDE,ZESTORETIC) 20-25 mg Tab Take 1 tablet by mouth.      metFORMIN (GLUCOPHAGE) 1000 MG tablet       rosuvastatin (CRESTOR) 10 MG tablet Take 10 mg by mouth nightly.      amLODIPine (NORVASC) 5 MG tablet Take 5 mg by mouth once daily. PATIENT ONLY TAKES WHEN BLOOD PRESSURE IS HIGH.      amoxicillin-clavulanate 875-125mg (AUGMENTIN) 875-125 mg per tablet Take 1 tablet by mouth 2 (two) times daily.      famotidine (PEPCID) 20 MG tablet 1 TAB(S) ORAL TWO TIMES A DAY      fenofibrate (TRICOR) 48 MG tablet Take by mouth.      fenofibrate 40 mg Tab Take by mouth.      furosemide (LASIX) 20 MG tablet Take 20 mg by mouth daily as needed.      insulin lispro 100 unit/mL injection Inject 10 Units into the skin 3 (three) times daily before meals.      LANTUS SOLOSTAR U-100 INSULIN glargine 100 units/mL (3mL) SubQ pen       lisinopriL (PRINIVIL,ZESTRIL) 20 MG tablet Take 20 mg by mouth once daily.      ondansetron (ZOFRAN) 4 MG tablet Take 8 mg by mouth every 8 (eight) hours as needed for Nausea.      OZEMPIC 1 mg/dose (4 mg/3 mL)       pantoprazole (PROTONIX) 20 MG tablet Take 40 mg by  mouth 2 (two) times daily before meals.      promethazine (PHENERGAN) 12.5 MG Tab Take 12.5 mg by mouth every 8 (eight) hours as needed.      sucralfate (CARAFATE) 1 gram tablet Take 1 g by mouth 4 (four) times daily.      traMADoL (ULTRAM) 50 mg tablet Take 50 mg by mouth every 12 (twelve) hours as needed for Pain.         Past Surgical History:   Procedure Laterality Date    COLONOSCOPY      ENDOSCOPIC ULTRASOUND OF UPPER GASTROINTESTINAL TRACT N/A 10/12/2020    Procedure: ULTRASOUND, UPPER GI TRACT, ENDOSCOPIC;  Surgeon: Tree Garsia MD;  Location: [facility] (2ND FLR);  Service: Endoscopy;  Laterality: N/A;  rapid-arrival 7a-staying at [place]-sister  1 month ago from elhers-danlos syndrome-tb    ENDOSCOPIC ULTRASOUND OF UPPER GASTROINTESTINAL TRACT N/A 2021    Procedure: ULTRASOUND, UPPER GI TRACT, ENDOSCOPIC;  Surgeon: Tree Garsia MD;  Location: [facility];  Service: Endoscopy;  Laterality: N/A;  Rapid COVID at 0945. Lives 2.5 hours from nearest testing site. ttr    ENDOSCOPIC ULTRASOUND OF UPPER GASTROINTESTINAL TRACT N/A 2021    Procedure: ULTRASOUND, UPPER GI TRACT, ENDOSCOPIC;  Surgeon: Tree Garsia MD;  Location: [facility] (2ND FLR);  Service: Endoscopy;  Laterality: N/A;  rapid covid 7am-inst email-tb    ESOPHAGEAL MANOMETRY WITH MEASUREMENT OF IMPEDANCE N/A 2021    Procedure: MANOMETRY, ESOPHAGUS, WITH IMPEDANCE MEASUREMENT;  Surgeon: Quang Rincon MD;  Location: [facility] (4TH FLR);  Service: Endoscopy;  Laterality: N/A;  fully vaccinated-will bring card day of procedure  getting 2nd shot -MS  Covid test -Kpvt   pt confirmed appt-Kpvt    ESOPHAGOGASTRODUODENOSCOPY      ESOPHAGOGASTRODUODENOSCOPY N/A 2021    Procedure: EGD (ESOPHAGOGASTRODUODENOSCOPY);  Surgeon: Tree Garsia MD;  Location: [facility] (2ND FLR);  Service: Endoscopy;  Laterality: N/A;    HYSTERECTOMY         Social History     Socioeconomic History    Marital status:    Tobacco  Use    Smoking status: Never    Smokeless tobacco: Never   Substance and Sexual Activity    Alcohol use: Never    Drug use: Never         CBC: No results for input(s): WBC, RBC, HGB, HCT, PLT, MCV, MCH, MCHC in the last 72 hours.    CMP: No results for input(s): NA, K, CL, CO2, BUN, CREATININE, GLU, MG, PHOS, CALCIUM, ALBUMIN, PROT, ALKPHOS, ALT, AST, BILITOT in the last 72 hours.    INR  No results for input(s): PT, INR, PROTIME, APTT in the last 72 hours.        Diagnostic Studies:      EKD Echo:  No results found for this or any previous visit.        Pre-op Assessment    I have reviewed the Patient Summary Reports.    I have reviewed the NPO Status.   I have reviewed the Medications.     Review of Systems  Anesthesia Hx:  No problems with previous Anesthesia   Denies Personal Hx of Anesthesia complications.   Cardiovascular:   Exercise tolerance: good    Pulmonary:   Sleep Apnea    Hepatic/GI:   Liver Disease,    Neurological:   Denies CVA. Denies Seizures.    Endocrine:   Diabetes  Morbid Obesity / BMI > 40      Physical Exam  General: Cooperative, Oriented and Alert    Airway:  Mallampati: III   Mouth Opening: Normal  TM Distance: Normal  Tongue: Large  Neck ROM: Normal ROM    Dental:  Intact        Anesthesia Plan  Type of Anesthesia, risks & benefits discussed:    Anesthesia Type: Gen Natural Airway  Intra-op Monitoring Plan: Standard ASA Monitors  Induction:  IV  Informed Consent: Informed consent signed with the Patient and all parties understand the risks and agree with anesthesia plan.  All questions answered.   ASA Score: 3  Day of Surgery Review of History & Physical: H&P Update referred to the surgeon/provider.    Ready For Surgery From Anesthesia Perspective.     .

## 2023-01-17 NOTE — TRANSFER OF CARE
"Anesthesia Transfer of Care Note    Patient: Ritika Britt    Procedure(s) Performed: Procedure(s) (LRB):  ULTRASOUND, UPPER GI TRACT, ENDOSCOPIC (N/A)    Patient location: PACU    Anesthesia Type: general    Transport from OR: Transported from OR on room air with adequate spontaneous ventilation    Post pain: adequate analgesia    Post assessment: no apparent anesthetic complications and tolerated procedure well    Post vital signs: stable    Level of consciousness: awake, alert and oriented    Nausea/Vomiting: no nausea/vomiting    Complications: none    Transfer of care protocol was followed      Last vitals:   Visit Vitals  /71 (BP Location: Left arm, Patient Position: Lying)   Pulse 85   Temp 36.4 °C (97.6 °F) (Temporal)   Resp 18   Ht 5' 4" (1.626 m)   Wt 99.8 kg (220 lb)   SpO2 95%   Breastfeeding No   BMI 37.76 kg/m²     "

## 2025-01-24 ENCOUNTER — TELEPHONE (OUTPATIENT)
Dept: GASTROENTEROLOGY | Facility: CLINIC | Age: 60
End: 2025-01-24

## 2025-01-24 NOTE — TELEPHONE ENCOUNTER
----- Message from Josefa sent at 1/24/2025 12:35 PM CST -----  Regarding: appt request  Name of Who is Calling: Ritika           What is the request in detail: Patient is requesting a call back to schedule an annual check up for her throat 3/20/2025 - 3/21/2025.             Can the clinic reply by [facility]: No           What Number to Call Back if not in [facility]:  [phone]

## 2025-03-08 ENCOUNTER — TELEPHONE (OUTPATIENT)
Dept: ENDOSCOPY | Facility: HOSPITAL | Age: 60
End: 2025-03-08
Payer: COMMERCIAL

## 2025-03-08 NOTE — TELEPHONE ENCOUNTER
Attempted to call patient to schedule EUS, no answer. Left voicemail for patient to return my call.

## 2025-03-27 ENCOUNTER — TELEPHONE (OUTPATIENT)
Dept: ENDOSCOPY | Facility: HOSPITAL | Age: 60
End: 2025-03-27
Payer: COMMERCIAL

## 2025-03-27 DIAGNOSIS — K22.9 SUBEPITHELIAL ESOPHAGEAL LESION: Primary | ICD-10-CM

## 2025-03-27 NOTE — TELEPHONE ENCOUNTER
Spoke to patient to schedule procedure(s) Upper Endoscopy Ultrasound (EUS)       Physician to perform procedure(s) Dr. NAVI Garsia  Date of Procedure (s) 5/23/25  Arrival Time 7:00 AM  Time of Procedure(s) 8:00 AM   Location of Procedure(s) [address]  Type of Rx Prep sent to patient: Other  Instructions provided to patient via [facility]    Patient was informed on the following information and verbalized understanding. Screening questionnaire reviewed with patient and complete. If procedure requires anesthesia, a responsible adult needs to be present to accompany the patient home, patient cannot drive after receiving anesthesia. Appointment details are tentative, especially check-in time. Patient will receive a prep-op call 7 days prior to confirm check-in time for procedure. If applicable the patient should contact their pharmacy to verify Rx for procedure prep is ready for pick-up. Patient was advised to call the scheduling department at [phone] if pharmacy states no Rx is available. Patient was advised to call the endoscopy scheduling department if any questions or concerns arise.      [facility] Scheduling Department

## 2025-04-25 ENCOUNTER — TELEPHONE (OUTPATIENT)
Dept: ENDOSCOPY | Facility: HOSPITAL | Age: 60
End: 2025-04-25
Payer: COMMERCIAL

## 2025-04-25 NOTE — TELEPHONE ENCOUNTER
----- Message -----   From: Yeni Reed   Sent: 4/25/2025   8:28 AM CDT   To: [facility] Endoscopy Schedulers   Subject: Rescheduling Procedure                                 Name Of Caller:   Ritika       Contact Preference:  [phone]       Nature of call:   Pt would like to reschedule their procedure for the fall.

## 2025-04-25 NOTE — TELEPHONE ENCOUNTER
Patient needs for ultrasound rescheduled but I don't deal with that. I only schedule for EGD and colonoscopy

## 2025-05-05 ENCOUNTER — PATIENT MESSAGE (OUTPATIENT)
Dept: GASTROENTEROLOGY | Facility: CLINIC | Age: 60
End: 2025-05-05
Payer: COMMERCIAL

## 2025-05-06 ENCOUNTER — TELEPHONE (OUTPATIENT)
Dept: ENDOSCOPY | Facility: HOSPITAL | Age: 60
End: 2025-05-06
Payer: COMMERCIAL

## 2025-05-06 NOTE — TELEPHONE ENCOUNTER
Received message from pt that she needs to reschedule her EUS on 5/23/25.  Telephoned pt to reschedule with no answer.  Voicemail message sent with direct contact number and portal message sent with contact information for pt to reschedule at her convenience.